# Patient Record
Sex: FEMALE | Race: BLACK OR AFRICAN AMERICAN | NOT HISPANIC OR LATINO | URBAN - METROPOLITAN AREA
[De-identification: names, ages, dates, MRNs, and addresses within clinical notes are randomized per-mention and may not be internally consistent; named-entity substitution may affect disease eponyms.]

---

## 2020-06-28 ENCOUNTER — INPATIENT (INPATIENT)
Facility: HOSPITAL | Age: 50
LOS: 4 days | Discharge: HOME | End: 2020-07-03
Attending: INTERNAL MEDICINE | Admitting: INTERNAL MEDICINE
Payer: MEDICAID

## 2020-06-28 VITALS
OXYGEN SATURATION: 100 % | HEART RATE: 71 BPM | DIASTOLIC BLOOD PRESSURE: 131 MMHG | SYSTOLIC BLOOD PRESSURE: 206 MMHG | TEMPERATURE: 99 F | RESPIRATION RATE: 16 BRPM

## 2020-06-28 LAB
ALBUMIN SERPL ELPH-MCNC: 3.9 G/DL — SIGNIFICANT CHANGE UP (ref 3.3–5)
ALP SERPL-CCNC: 73 U/L — SIGNIFICANT CHANGE UP (ref 40–120)
ALT FLD-CCNC: 6 U/L — SIGNIFICANT CHANGE UP (ref 4–33)
ANION GAP SERPL CALC-SCNC: 15 MMO/L — HIGH (ref 7–14)
APPEARANCE UR: CLEAR — SIGNIFICANT CHANGE UP
APTT BLD: 30.8 SEC — SIGNIFICANT CHANGE UP (ref 27.5–36.3)
AST SERPL-CCNC: 13 U/L — SIGNIFICANT CHANGE UP (ref 4–32)
BACTERIA # UR AUTO: NEGATIVE — SIGNIFICANT CHANGE UP
BASOPHILS # BLD AUTO: 0.03 K/UL — SIGNIFICANT CHANGE UP (ref 0–0.2)
BASOPHILS NFR BLD AUTO: 0.5 % — SIGNIFICANT CHANGE UP (ref 0–2)
BILIRUB SERPL-MCNC: < 0.2 MG/DL — LOW (ref 0.2–1.2)
BILIRUB UR-MCNC: NEGATIVE — SIGNIFICANT CHANGE UP
BLD GP AB SCN SERPL QL: NEGATIVE — SIGNIFICANT CHANGE UP
BLOOD UR QL VISUAL: HIGH
BUN SERPL-MCNC: 30 MG/DL — HIGH (ref 7–23)
CALCIUM SERPL-MCNC: 9.9 MG/DL — SIGNIFICANT CHANGE UP (ref 8.4–10.5)
CHLORIDE SERPL-SCNC: 109 MMOL/L — HIGH (ref 98–107)
CO2 SERPL-SCNC: 18 MMOL/L — LOW (ref 22–31)
COLOR SPEC: COLORLESS — SIGNIFICANT CHANGE UP
CREAT SERPL-MCNC: 2.07 MG/DL — HIGH (ref 0.5–1.3)
EOSINOPHIL # BLD AUTO: 0.16 K/UL — SIGNIFICANT CHANGE UP (ref 0–0.5)
EOSINOPHIL NFR BLD AUTO: 2.5 % — SIGNIFICANT CHANGE UP (ref 0–6)
GLUCOSE SERPL-MCNC: 97 MG/DL — SIGNIFICANT CHANGE UP (ref 70–99)
GLUCOSE UR-MCNC: NEGATIVE — SIGNIFICANT CHANGE UP
HCT VFR BLD CALC: 35.6 % — SIGNIFICANT CHANGE UP (ref 34.5–45)
HGB BLD-MCNC: 11.3 G/DL — LOW (ref 11.5–15.5)
HYALINE CASTS # UR AUTO: NEGATIVE — SIGNIFICANT CHANGE UP
IMM GRANULOCYTES NFR BLD AUTO: 0.2 % — SIGNIFICANT CHANGE UP (ref 0–1.5)
INR BLD: 1.03 — SIGNIFICANT CHANGE UP (ref 0.88–1.17)
KETONES UR-MCNC: NEGATIVE — SIGNIFICANT CHANGE UP
LEUKOCYTE ESTERASE UR-ACNC: NEGATIVE — SIGNIFICANT CHANGE UP
LYMPHOCYTES # BLD AUTO: 2.33 K/UL — SIGNIFICANT CHANGE UP (ref 1–3.3)
LYMPHOCYTES # BLD AUTO: 35.8 % — SIGNIFICANT CHANGE UP (ref 13–44)
MCHC RBC-ENTMCNC: 27.8 PG — SIGNIFICANT CHANGE UP (ref 27–34)
MCHC RBC-ENTMCNC: 31.7 % — LOW (ref 32–36)
MCV RBC AUTO: 87.7 FL — SIGNIFICANT CHANGE UP (ref 80–100)
MONOCYTES # BLD AUTO: 0.51 K/UL — SIGNIFICANT CHANGE UP (ref 0–0.9)
MONOCYTES NFR BLD AUTO: 7.8 % — SIGNIFICANT CHANGE UP (ref 2–14)
NEUTROPHILS # BLD AUTO: 3.46 K/UL — SIGNIFICANT CHANGE UP (ref 1.8–7.4)
NEUTROPHILS NFR BLD AUTO: 53.2 % — SIGNIFICANT CHANGE UP (ref 43–77)
NITRITE UR-MCNC: NEGATIVE — SIGNIFICANT CHANGE UP
NRBC # FLD: 0 K/UL — SIGNIFICANT CHANGE UP (ref 0–0)
PH UR: 6 — SIGNIFICANT CHANGE UP (ref 5–8)
PLATELET # BLD AUTO: 284 K/UL — SIGNIFICANT CHANGE UP (ref 150–400)
PMV BLD: 9.7 FL — SIGNIFICANT CHANGE UP (ref 7–13)
POTASSIUM SERPL-MCNC: 4 MMOL/L — SIGNIFICANT CHANGE UP (ref 3.5–5.3)
POTASSIUM SERPL-SCNC: 4 MMOL/L — SIGNIFICANT CHANGE UP (ref 3.5–5.3)
PROT SERPL-MCNC: 7.3 G/DL — SIGNIFICANT CHANGE UP (ref 6–8.3)
PROT UR-MCNC: 200 — HIGH
PROTHROM AB SERPL-ACNC: 11.8 SEC — SIGNIFICANT CHANGE UP (ref 9.8–13.1)
RBC # BLD: 4.06 M/UL — SIGNIFICANT CHANGE UP (ref 3.8–5.2)
RBC # FLD: 21.3 % — HIGH (ref 10.3–14.5)
RBC CASTS # UR COMP ASSIST: >50 — HIGH (ref 0–?)
RH IG SCN BLD-IMP: POSITIVE — SIGNIFICANT CHANGE UP
SODIUM SERPL-SCNC: 142 MMOL/L — SIGNIFICANT CHANGE UP (ref 135–145)
SP GR SPEC: 1.02 — SIGNIFICANT CHANGE UP (ref 1–1.04)
SQUAMOUS # UR AUTO: SIGNIFICANT CHANGE UP
TROPONIN T, HIGH SENSITIVITY: 20 NG/L — SIGNIFICANT CHANGE UP (ref ?–14)
UROBILINOGEN FLD QL: NORMAL — SIGNIFICANT CHANGE UP
WBC # BLD: 6.5 K/UL — SIGNIFICANT CHANGE UP (ref 3.8–10.5)
WBC # FLD AUTO: 6.5 K/UL — SIGNIFICANT CHANGE UP (ref 3.8–10.5)
WBC UR QL: SIGNIFICANT CHANGE UP (ref 0–?)

## 2020-06-28 PROCEDURE — 70450 CT HEAD/BRAIN W/O DYE: CPT | Mod: 26

## 2020-06-28 PROCEDURE — 76830 TRANSVAGINAL US NON-OB: CPT | Mod: 26

## 2020-06-28 RX ORDER — SODIUM CHLORIDE 9 MG/ML
1000 INJECTION INTRAMUSCULAR; INTRAVENOUS; SUBCUTANEOUS ONCE
Refills: 0 | Status: COMPLETED | OUTPATIENT
Start: 2020-06-28 | End: 2020-06-28

## 2020-06-28 RX ORDER — LABETALOL HCL 100 MG
10 TABLET ORAL ONCE
Refills: 0 | Status: COMPLETED | OUTPATIENT
Start: 2020-06-28 | End: 2020-06-28

## 2020-06-28 RX ORDER — ACETAMINOPHEN 500 MG
650 TABLET ORAL ONCE
Refills: 0 | Status: COMPLETED | OUTPATIENT
Start: 2020-06-28 | End: 2020-06-28

## 2020-06-28 RX ORDER — NICARDIPINE HYDROCHLORIDE 30 MG/1
5 CAPSULE, EXTENDED RELEASE ORAL
Qty: 40 | Refills: 0 | Status: DISCONTINUED | OUTPATIENT
Start: 2020-06-28 | End: 2020-06-29

## 2020-06-28 RX ORDER — METOCLOPRAMIDE HCL 10 MG
10 TABLET ORAL ONCE
Refills: 0 | Status: COMPLETED | OUTPATIENT
Start: 2020-06-28 | End: 2020-06-28

## 2020-06-28 RX ORDER — MORPHINE SULFATE 50 MG/1
2 CAPSULE, EXTENDED RELEASE ORAL ONCE
Refills: 0 | Status: DISCONTINUED | OUTPATIENT
Start: 2020-06-28 | End: 2020-06-28

## 2020-06-28 RX ADMIN — SODIUM CHLORIDE 1000 MILLILITER(S): 9 INJECTION INTRAMUSCULAR; INTRAVENOUS; SUBCUTANEOUS at 22:15

## 2020-06-28 RX ADMIN — MORPHINE SULFATE 2 MILLIGRAM(S): 50 CAPSULE, EXTENDED RELEASE ORAL at 22:15

## 2020-06-28 RX ADMIN — Medication 10 MILLIGRAM(S): at 22:59

## 2020-06-28 RX ADMIN — Medication 10 MILLIGRAM(S): at 21:15

## 2020-06-28 RX ADMIN — Medication 650 MILLIGRAM(S): at 22:15

## 2020-06-28 RX ADMIN — NICARDIPINE HYDROCHLORIDE 25 MG/HR: 30 CAPSULE, EXTENDED RELEASE ORAL at 23:25

## 2020-06-28 RX ADMIN — Medication 10 MILLIGRAM(S): at 22:20

## 2020-06-28 NOTE — ED ADULT NURSE NOTE - OBJECTIVE STATEMENT
pt is awake, alert and oriented, breathing even and unlabored, reports dysfunctional uterine bleeding X1 month, states bleeding had slowed and stopped around fathers day, states starting this morning pt had acute resurgence of bleeding, approx 1 pad q30min, with associated dizziness. pt is ambulatory with steady gait at this time, complaining of pain in LRQ abdominal area and sharp/pressure pain in vagina. Denies fevers/n.v.d.  bedside pelvic and ultrasound performed by resident with Maddie CHEN present, 18G placed RAC, blood sent to lab, will continue to monitor.

## 2020-06-28 NOTE — CONSULT NOTE ADULT - SUBJECTIVE AND OBJECTIVE BOX
THANIA STUBBS  49y  Female [id number]    HPI: 50 yo  LMP unclear due to prolonged bleeding presenting to the ED with 1 month of intermittent heavy vaginal bleeding, w/ increased bleeding today using 12 pads over 6 hours with symptomatic anemia experiencing dizziness and lightheadedness.  Pt reports being told when she was pregnancy many years ago she had small fibroids, however, has not previously been symptomatic.  Periods are typically monthly w/ moderate flow lasting 4 days using 1 pad/4 hrs.  Pt began bleeding at the beginning of July what she thought was her normal period and has continued to bleed throughout the month.  Today bleeding increased and pt experienced large passage of clots, the greatest about a fist size.         Name of GYN Physician: [facility]    POB:     twin pregnancy c section at 7 months   FT r c section  TOP x2 by D&C ,     Home meds:   clonadine 0.5 BID  norvastatin 100 mg QD    Hospital Meds:   MEDICATIONS  (STANDING):  metoclopramide Injectable 10 milliGRAM(s) IV Push once    Allergies    No Known Allergies      PAST MEDICAL & SURGICAL HISTORY:  Hypertension  c section x2  D&C x2  R ankle broken    Social History:  Denies smoking use, drug use, alcohol use    Vital Signs Last 24 Hrs  T(C): 37 (2020 21:10), Max: 37 (2020 20:09)  T(F): 98.6 (2020 21:10), Max: 98.6 (2020 20:09)  HR: 75 (2020 22:43) (69 - 81)  BP: 270/123 (2020 22:43) (206/131 - 270/123)  BP(mean): 167 (2020 22:43) (167 - 167)  RR: 18 (2020 22:43) (16 - 18)  SpO2: 100% (2020 22:43) (100% - 100%)    Physical Exam:   General: sitting comftorably in bed, NAD   HEENT: neck supple, full ROM  CV: RR S1S2 no m/r/g  Lungs: CTA b/l, good air flow b/l   Back: No CVA tenderness  Abd: Soft, non-tender, non-distended.  Bowel sounds present.    :  No bleeding on pad.    External labia wnl.  Bimanual exam with no cervical motion tenderness, uterus wnl, adnexa non palpable b/l.  Cervix closed vs. Cervix dilated    cm   Speculum Exam: No active bleeding from os.  Physiologic discharge.    Ext: non-tender b/l, no edema     LABS:                          11.3   6.50  )-----------( 284      ( 2020 20:42 )             35.6         142  |  109<H>  |  30<H>  ----------------------------<  97  4.0   |  18<L>  |  2.07<H>    Ca    9.9      2020 20:42    TPro  7.3  /  Alb  3.9  /  TBili  < 0.2<L>  /  DBili  x   /  AST  13  /  ALT  6   /  AlkPhos  73      I&O's Detail    PT/INR - ( 2020 20:42 )   PT: 11.8 SEC;   INR: 1.03          PTT - ( 2020 20:42 )  PTT:30.8 SEC  Urinalysis Basic - ( 2020 21:00 )    Color: COLORLESS / Appearance: CLEAR / S.016 / pH: 6.0  Gluc: NEGATIVE / Ketone: NEGATIVE  / Bili: NEGATIVE / Urobili: NORMAL   Blood: MODERATE / Protein: 200 / Nitrite: NEGATIVE   Leuk Esterase: NEGATIVE / RBC: >50 / WBC 3-5   Sq Epi: OCC / Non Sq Epi: x / Bacteria: NEGATIVE    RADIOLOGY & ADDITIONAL STUDIES: THANIA STUBBS  49y  Female [id number]    HPI: 50 yo  LMP unclear due to prolonged bleeding presenting to the ED with 1 month of intermittent heavy vaginal bleeding, w/ increased bleeding today using 12 pads over 6 hours with symptomatic anemia experiencing dizziness and lightheadedness.  Pt reports being told when she was pregnancy many years ago she had small fibroids, however, has not previously been symptomatic.  Periods are typically monthly w/ moderate flow lasting 4 days using 1 pad/4 hrs.  Pt began bleeding at the beginning of July what she thought was her normal period and has continued to bleed throughout the month.  Today bleeding increased and pt experienced large passage of clots, the greatest about a fist size.         Name of GYN Physician: [facility]    POB:     twin pregnancy c section at 7 months   FT r c section  TOP x2 by D&C ,     Home meds:   clonadine 0.5 BID  norvastatin 100 mg QD    Hospital Meds:   MEDICATIONS  (STANDING):  metoclopramide Injectable 10 milliGRAM(s) IV Push once    Allergies    No Known Allergies      PAST MEDICAL & SURGICAL HISTORY:  Hypertension  c section x2  D&C x2  R ankle broken    Social History:  Denies smoking use, drug use, alcohol use    Vital Signs Last 24 Hrs  T(C): 37 (2020 21:10), Max: 37 (2020 20:09)  T(F): 98.6 (2020 21:10), Max: 98.6 (2020 20:09)  HR: 75 (2020 22:43) (69 - 81)  BP: 270/123 (2020 22:43) (206/131 - 270/123)  BP(mean): 167 (2020 22:43) (167 - 167)  RR: 18 (2020 22:43) (16 - 18)  SpO2: 100% (2020 22:43) (100% - 100%)    Physical Exam:   General: sitting comfortably in bed  CV: RR S1S2 no m/r/g  Lungs: CTA b/l, good air flow b/l   Abd: Soft, non-tender, non-distended.  Bowel sounds present.    :  Saturated pad.    External labia wnl.  Bimanual exam with no cervical motion tenderness, uterus measuring 12 wks size, adnexa non palpable b/l.  Cervix closed  Speculum Exam: Mild bleeding from os.   Ext: non-tender b/l, no edema     LABS:                          11.3   6.50  )-----------( 284      ( 2020 20:42 )             35.6         142  |  109<H>  |  30<H>  ----------------------------<  97  4.0   |  18<L>  |  2.07<H>    Ca    9.9      2020 20:42    TPro  7.3  /  Alb  3.9  /  TBili  < 0.2<L>  /  DBili  x   /  AST  13  /  ALT  6   /  AlkPhos  73      I&O's Detail    PT/INR - ( 2020 20:42 )   PT: 11.8 SEC;   INR: 1.03          PTT - ( 2020 20:42 )  PTT:30.8 SEC  Urinalysis Basic - ( 2020 21:00 )    Color: COLORLESS / Appearance: CLEAR / S.016 / pH: 6.0  Gluc: NEGATIVE / Ketone: NEGATIVE  / Bili: NEGATIVE / Urobili: NORMAL   Blood: MODERATE / Protein: 200 / Nitrite: NEGATIVE   Leuk Esterase: NEGATIVE / RBC: >50 / WBC 3-5   Sq Epi: OCC / Non Sq Epi: x / Bacteria: NEGATIVE    RADIOLOGY & ADDITIONAL STUDIES: THANIA STUBBS  49y  Female [id number]    HPI: 48 yo  LMP unclear due to prolonged bleeding presenting to the ED with 1 month of intermittent heavy vaginal bleeding, w/ increased bleeding today using 12 pads over 6 hours with symptomatic anemia experiencing dizziness and lightheadedness.  Pt reports being told when she was pregnant many years ago she had small fibroids, however, has not previously been symptomatic.  Periods are typically monthly w/ moderate flow lasting 4 days using 1 pad/4 hrs.  Pt began bleeding at the beginning of  what she thought was her normal period and has continued to bleed throughout the month.  Today bleeding increased and pt experienced large passage of clots, the greatest about a fist size. Pt is reporting complaints of headache w/ frontal throbbing pain w/ associated blurry vision, dizziness, and lightheadedness.       Name of GYN Physician: [facility]    POB:     twin pregnancy c section at 7 months  2001 FT r c section  TOP x2 by D&C ,     Home meds:   clonadine 0.5 BID  norvastatin 100 mg QD    Hospital Meds:   MEDICATIONS  (STANDING):  metoclopramide Injectable 10 milliGRAM(s) IV Push once    Allergies    No Known Allergies      PAST MEDICAL & SURGICAL HISTORY:  Hypertension  c section x2  D&C x2  R ankle broken    Social History:  Denies smoking use, drug use, alcohol use    Vital Signs Last 24 Hrs  T(C): 37 (2020 21:10), Max: 37 (2020 20:09)  T(F): 98.6 (2020 21:10), Max: 98.6 (2020 20:09)  HR: 75 (2020 22:43) (69 - 81)  BP: 270/123 (2020 22:43) (206/131 - 270/123)  BP(mean): 167 (2020 22:43) (167 - 167)  RR: 18 (2020 22:43) (16 - 18)  SpO2: 100% (2020 22:43) (100% - 100%)    Physical Exam:   General: sitting comfortably in bed  CV: RR S1S2 no m/r/g  Lungs: CTA b/l, good air flow b/l   Abd: Soft, non-tender, non-distended.  Bowel sounds present.    :  Saturated pad.    External labia wnl.  Bimanual exam with no cervical motion tenderness, uterus measuring 12 wks size, adnexa non palpable b/l.  Cervix closed  Speculum Exam: Mild bleeding from os.   Ext: non-tender b/l, no edema     LABS:                          11.3   6.50  )-----------( 284      ( 2020 20:42 )             35.6         142  |  109<H>  |  30<H>  ----------------------------<  97  4.0   |  18<L>  |  2.07<H>    Ca    9.9      2020 20:42    TPro  7.3  /  Alb  3.9  /  TBili  < 0.2<L>  /  DBili  x   /  AST  13  /  ALT  6   /  AlkPhos  73      I&O's Detail    PT/INR - ( 2020 20:42 )   PT: 11.8 SEC;   INR: 1.03          PTT - ( 2020 20:42 )  PTT:30.8 SEC  Urinalysis Basic - ( 2020 21:00 )    Color: COLORLESS / Appearance: CLEAR / S.016 / pH: 6.0  Gluc: NEGATIVE / Ketone: NEGATIVE  / Bili: NEGATIVE / Urobili: NORMAL   Blood: MODERATE / Protein: 200 / Nitrite: NEGATIVE   Leuk Esterase: NEGATIVE / RBC: >50 / WBC 3-5   Sq Epi: OCC / Non Sq Epi: x / Bacteria: NEGATIVE    RADIOLOGY & ADDITIONAL STUDIES:  < from: US Transvaginal (20 @ 21:37) >  EXAM:  US TRANSVAGINAL        PROCEDURE DATE:  2020       INTERPRETATION:  CLINICAL INFORMATION: Vaginal bleeding.    LMP: 2020.    COMPARISON: None available.    TECHNIQUE:   Endovaginal pelvic sonogram only. Color and Spectral Doppler was performed.     FINDINGS:    Uterus: Measures 10.9 x 5.9 x 8.6 cm. and demonstrates a heterogeneous echotexture 3.6 x 2.8 x 2.7 cm intramural uterine fibroid suggested. In addition, there is increased echotexture of the central uterine region which is nonspecific, however may be seen with adenomyosis.  Endometrium: 7 mm. Within normal limits.    Right ovary: 2.7 x 2.7 x 2.1 cm. Within normal limits. Normal arterial waveforms. Complex septated cyst measuring 1.7 x 1.8 x 1.5 cm.  Left ovary: Not visualized on this exam.    Fluid: None.    IMPRESSION:  1. There is a 3.6 x 2.8 x 2.7 cm intramural uterine fibroid.  2. There is also a 1.7 x 1.8 x 1.5 cm complex septated cyst of the right ovary. Follow-up ultrasound can be obtained in 2 cycles to document resolution/stability.  3. Correlate with possible adenomyosis.  4. Left ovary was not visualized on this exam.    KEERTHI ROSE M.D., RADIOLOGY RESIDENT  This document has been electronically signed.  SHAYNA HULL M.D., ATTENDING RADIOLOGIST  This document has been electronically signed. 2020 11:22PM THANIA STUBBS  49y  Female [id number]    HPI: 50 yo  LMP unclear due to prolonged bleeding presenting to the ED with 1 month of intermittent heavy vaginal bleeding, w/ increased bleeding today using 12 pads over 6 hours with symptomatic anemia experiencing dizziness and lightheadedness.  Pt reports being told when she was pregnant many years ago she had small fibroids, however, has not previously been symptomatic.  Periods are typically monthly w/ moderate flow lasting 4 days using 1 pad/4 hrs.  Pt began bleeding at the beginning of  what she thought was her normal period and has continued to bleed throughout the month.  Today bleeding increased and pt experienced large passage of clots, the greatest about a fist size. Pt is reporting complaints of headache w/ frontal throbbing pain w/ associated blurry vision, dizziness, and lightheadedness.       Name of GYN Physician: [facility]    POB:     twin pregnancy c section at 7 months  2001 FT r c section  TOP x2 by D&C ,     Home meds:   clonadine 0.5 BID  norvastatin 100 mg QD    Hospital Meds:   MEDICATIONS  (STANDING):  metoclopramide Injectable 10 milliGRAM(s) IV Push once    Allergies    No Known Allergies      PAST MEDICAL & SURGICAL HISTORY:  Hypertension  c section x2  D&C x2  R ankle broken    Social History:  Denies smoking use, drug use, alcohol use    Vital Signs Last 24 Hrs  T(C): 37 (2020 21:10), Max: 37 (2020 20:09)  T(F): 98.6 (2020 21:10), Max: 98.6 (2020 20:09)  HR: 75 (2020 22:43) (69 - 81)  BP: 270/123 (2020 22:43) (206/131 - 270/123)  BP(mean): 167 (2020 22:43) (167 - 167)  RR: 18 (2020 22:43) (16 - 18)  SpO2: 100% (2020 22:43) (100% - 100%)    Physical Exam:   General: sitting comfortably in bed  CV: RR S1S2 no m/r/g  Lungs: CTA b/l, good air flow b/l   Abd: Soft, non-tender, non-distended.  Bowel sounds present.    :  Saturated pad.    External labia wnl.  Bimanual exam with no cervical motion tenderness, uterus measuring 12 wks size, adnexa non palpable b/l.  Cervix closed  Speculum Exam: Mild bleeding from os.   Ext: non-tender b/l, no edema     LABS:             10.7   7.08  )-----------( 269      (  @ 23:35 )             34.1                11.3   6.50  )-----------( 284      (  @ 20:42 )             35.6           142  |  109<H>  |  30<H>  ----------------------------<  97  4.0   |  18<L>  |  2.07<H>    Ca    9.9      2020 20:42    TPro  7.3  /  Alb  3.9  /  TBili  < 0.2<L>  /  DBili  x   /  AST  13  /  ALT  6   /  AlkPhos  73      I&O's Detail    PT/INR - ( 2020 20:42 )   PT: 11.8 SEC;   INR: 1.03          PTT - ( 2020 20:42 )  PTT:30.8 SEC  Urinalysis Basic - ( 2020 21:00 )    Color: COLORLESS / Appearance: CLEAR / S.016 / pH: 6.0  Gluc: NEGATIVE / Ketone: NEGATIVE  / Bili: NEGATIVE / Urobili: NORMAL   Blood: MODERATE / Protein: 200 / Nitrite: NEGATIVE   Leuk Esterase: NEGATIVE / RBC: >50 / WBC 3-5   Sq Epi: OCC / Non Sq Epi: x / Bacteria: NEGATIVE    RADIOLOGY & ADDITIONAL STUDIES:  < from: US Transvaginal (20 @ 21:37) >  EXAM:  US TRANSVAGINAL        PROCEDURE DATE:  2020       INTERPRETATION:  CLINICAL INFORMATION: Vaginal bleeding.    LMP: 2020.    COMPARISON: None available.    TECHNIQUE:   Endovaginal pelvic sonogram only. Color and Spectral Doppler was performed.     FINDINGS:    Uterus: Measures 10.9 x 5.9 x 8.6 cm. and demonstrates a heterogeneous echotexture 3.6 x 2.8 x 2.7 cm intramural uterine fibroid suggested. In addition, there is increased echotexture of the central uterine region which is nonspecific, however may be seen with adenomyosis.  Endometrium: 7 mm. Within normal limits.    Right ovary: 2.7 x 2.7 x 2.1 cm. Within normal limits. Normal arterial waveforms. Complex septated cyst measuring 1.7 x 1.8 x 1.5 cm.  Left ovary: Not visualized on this exam.    Fluid: None.    IMPRESSION:  1. There is a 3.6 x 2.8 x 2.7 cm intramural uterine fibroid.  2. There is also a 1.7 x 1.8 x 1.5 cm complex septated cyst of the right ovary. Follow-up ultrasound can be obtained in 2 cycles to document resolution/stability.  3. Correlate with possible adenomyosis.  4. Left ovary was not visualized on this exam.    KEERTHI ROSE M.D., RADIOLOGY RESIDENT  This document has been electronically signed.  SHAYNA HULL M.D., ATTENDING RADIOLOGIST  This document has been electronically signed. 2020 11:22PM

## 2020-06-28 NOTE — ED PROVIDER NOTE - CLINICAL SUMMARY MEDICAL DECISION MAKING FREE TEXT BOX
h.o fibroids, p.w acute vaginal bleeding 1pad /30 minutes today for 6 hours. no traumas, mild abdominal pain lower. will get t&s, tvus, labs, coag, pelvic, if severe anemia, transfusion. due to hx of fibroids, will not persuade ct ap for other bleeding etiology. ua and urine culture. pain control. dispo pending labs and advance imaging.

## 2020-06-28 NOTE — ED PROVIDER NOTE - OBJECTIVE STATEMENT
49F, h.o fibroid, HTN, p.w heavy vaginal bleeding today w. clots, 1 pad every 30 minutes for 6 hrs. Patient has lower abdominal pain today, sharp nonradiating. LMS last wk and no bleeding for the last 3-4 days. patient has blurry vision and dizziness. no fever, cough, shortness of breath, trauma.

## 2020-06-28 NOTE — ED PROVIDER NOTE - ATTENDING CONTRIBUTION TO CARE
Aubrie Cedeno M.D: 49F hx fibroids p/w heavy VB x1 day, going through one pad every 30 minutes. +dizziness + lower abd cramping no cp no sob no n/v/c/d. never had anything like this before. on exam pt seems anxious, lungs ctab, heart rrr, ttp suprapubically, pelvic exam performed by resident, oozing from closed os. suspect that pt is having dysfunctional uterine bleeding from fibroids and that blood loss is causing severe dizziness, but pt also severely hypertensive and ekg with twi in lateral leads.will attempt to control bp, check labs including troponin to assess for anemia, end organ damage from htn, tvus, ob consult and reassess.

## 2020-06-28 NOTE — ED PROVIDER NOTE - PHYSICAL EXAMINATION
General: NAD, good hygiene, well developed  HENT: Atraumatic, EOMI, no conjunctivae injection, moist mucosa  Neck: normal ROM and trachea midline   Cardiovascular: RRR, S1&2, no M or R, radial pulses equal and b/l  Respiratory: CTABL, no wheezes or crackles, no decreased breath sounds  Abdominal: soft and tender in the lower abdomen, non distended, neg for guarding, novak's sign, rovsing's sign, mcburney's sign, no CVA tenderness   Extremities: no edema of the legs/feet, DP/PT equal b/l  Skin: warm, well perfused  Neurologic: nonfocal, AAOx3  Psych: normal mood and affect General: NAD, good hygiene, well developed  HENT: Atraumatic, EOMI, no conjunctivae injection, moist mucosa  Neck: normal ROM and trachea midline   Cardiovascular: RRR, S1&2, no M or R, radial pulses equal and b/l  Respiratory: CTABL, no wheezes or crackles, no decreased breath sounds  Abdominal: soft and mild tenderness in the lower abdomen, non distended, neg for guarding, novak's sign, rovsing's sign, mcburney's sign, no CVA tenderness   Pelvic: os closed w. mild blood oozing, no sign of ulcers.   Extremities: no edema of the legs/feet, DP/PT equal b/l  Skin: warm, well perfused  Neurologic: nonfocal, AAOx3  Psych: normal mood and affect

## 2020-06-28 NOTE — ED ADULT TRIAGE NOTE - CHIEF COMPLAINT QUOTE
Pt. c/o vaginal bleeding and pelvic pain x 1 month starting at start of period. Also c/o dizziness and blurry vision. States she's been soaking a pad every half an hour. BP noted to be elevated in triage. h/o HTN and has been compliant with meds. Denies cp, sob, n/v.

## 2020-06-28 NOTE — CONSULT NOTE ADULT - ASSESSMENT
48 yo  w/ heavy vaginal bleeding secondary to fibroids presenting w/ increase in bleeding and symptomatic anemia. 48 yo  w/ heavy vaginal bleeding secondary to fibroids presenting w/ increase in bleeding and symptomatic anemia.  TVUS w/ evidence of intramural fibroid and adenomyosis.  At time of evaluation, bleeding controlled and decreased in flow.  Pt currently with uncontrolled blood pressure in hypertensive urgency requiring IV drip antihypertensive.

## 2020-06-28 NOTE — ED PROVIDER NOTE - PROGRESS NOTE DETAILS
Jose Parson, PGY 2: patient is hypertensive to 200s and take clonidine at home and 10 labetalol is given. will monitor. Jose Parson, PGY 2: patient continue to have SBP of 270, yesterday BP was 130s per patient. Patient has worsening headache w.o any neuro deficits, concerning for ICH, will get CT head and will start on nifedipine ggt for bp control. spoke with ob/gyn and pending rpt cbc. Jose Parson, PGY 2: sleeping comfortably, nifedipine is off for 30 minutes. bp 100s.

## 2020-06-29 DIAGNOSIS — Z98.891 HISTORY OF UTERINE SCAR FROM PREVIOUS SURGERY: Chronic | ICD-10-CM

## 2020-06-29 DIAGNOSIS — Z79.899 OTHER LONG TERM (CURRENT) DRUG THERAPY: ICD-10-CM

## 2020-06-29 DIAGNOSIS — Z02.9 ENCOUNTER FOR ADMINISTRATIVE EXAMINATIONS, UNSPECIFIED: ICD-10-CM

## 2020-06-29 DIAGNOSIS — I16.1 HYPERTENSIVE EMERGENCY: ICD-10-CM

## 2020-06-29 DIAGNOSIS — I16.0 HYPERTENSIVE URGENCY: ICD-10-CM

## 2020-06-29 DIAGNOSIS — Z29.9 ENCOUNTER FOR PROPHYLACTIC MEASURES, UNSPECIFIED: ICD-10-CM

## 2020-06-29 DIAGNOSIS — N93.9 ABNORMAL UTERINE AND VAGINAL BLEEDING, UNSPECIFIED: ICD-10-CM

## 2020-06-29 DIAGNOSIS — N17.9 ACUTE KIDNEY FAILURE, UNSPECIFIED: ICD-10-CM

## 2020-06-29 DIAGNOSIS — R79.89 OTHER SPECIFIED ABNORMAL FINDINGS OF BLOOD CHEMISTRY: ICD-10-CM

## 2020-06-29 LAB
ALBUMIN SERPL ELPH-MCNC: 3.6 G/DL — SIGNIFICANT CHANGE UP (ref 3.3–5)
ALP SERPL-CCNC: 57 U/L — SIGNIFICANT CHANGE UP (ref 40–120)
ALT FLD-CCNC: 8 U/L — SIGNIFICANT CHANGE UP (ref 4–33)
ANION GAP SERPL CALC-SCNC: 11 MMO/L — SIGNIFICANT CHANGE UP (ref 7–14)
ANION GAP SERPL CALC-SCNC: 14 MMO/L — SIGNIFICANT CHANGE UP (ref 7–14)
AST SERPL-CCNC: 11 U/L — SIGNIFICANT CHANGE UP (ref 4–32)
BASOPHILS # BLD AUTO: 0.02 K/UL — SIGNIFICANT CHANGE UP (ref 0–0.2)
BASOPHILS NFR BLD AUTO: 0.3 % — SIGNIFICANT CHANGE UP (ref 0–2)
BILIRUB SERPL-MCNC: < 0.2 MG/DL — LOW (ref 0.2–1.2)
BUN SERPL-MCNC: 27 MG/DL — HIGH (ref 7–23)
BUN SERPL-MCNC: 28 MG/DL — HIGH (ref 7–23)
CALCIUM SERPL-MCNC: 9 MG/DL — SIGNIFICANT CHANGE UP (ref 8.4–10.5)
CALCIUM SERPL-MCNC: 9.1 MG/DL — SIGNIFICANT CHANGE UP (ref 8.4–10.5)
CHLORIDE SERPL-SCNC: 110 MMOL/L — HIGH (ref 98–107)
CHLORIDE SERPL-SCNC: 111 MMOL/L — HIGH (ref 98–107)
CHOLEST SERPL-MCNC: 172 MG/DL — SIGNIFICANT CHANGE UP (ref 120–199)
CO2 SERPL-SCNC: 15 MMOL/L — LOW (ref 22–31)
CO2 SERPL-SCNC: 19 MMOL/L — LOW (ref 22–31)
CREAT SERPL-MCNC: 1.75 MG/DL — HIGH (ref 0.5–1.3)
CREAT SERPL-MCNC: 1.89 MG/DL — HIGH (ref 0.5–1.3)
CULTURE RESULTS: SIGNIFICANT CHANGE UP
EOSINOPHIL # BLD AUTO: 0.11 K/UL — SIGNIFICANT CHANGE UP (ref 0–0.5)
EOSINOPHIL NFR BLD AUTO: 1.6 % — SIGNIFICANT CHANGE UP (ref 0–6)
GLUCOSE SERPL-MCNC: 119 MG/DL — HIGH (ref 70–99)
GLUCOSE SERPL-MCNC: 92 MG/DL — SIGNIFICANT CHANGE UP (ref 70–99)
HBA1C BLD-MCNC: 4.6 % — SIGNIFICANT CHANGE UP (ref 4–5.6)
HCT VFR BLD CALC: 29.5 % — LOW (ref 34.5–45)
HCT VFR BLD CALC: 32.1 % — LOW (ref 34.5–45)
HCT VFR BLD CALC: 34.1 % — LOW (ref 34.5–45)
HDLC SERPL-MCNC: 68 MG/DL — HIGH (ref 45–65)
HGB BLD-MCNC: 10 G/DL — LOW (ref 11.5–15.5)
HGB BLD-MCNC: 10.7 G/DL — LOW (ref 11.5–15.5)
HGB BLD-MCNC: 9.4 G/DL — LOW (ref 11.5–15.5)
IMM GRANULOCYTES NFR BLD AUTO: 0.3 % — SIGNIFICANT CHANGE UP (ref 0–1.5)
LIPID PNL WITH DIRECT LDL SERPL: 98 MG/DL — SIGNIFICANT CHANGE UP
LYMPHOCYTES # BLD AUTO: 1.42 K/UL — SIGNIFICANT CHANGE UP (ref 1–3.3)
LYMPHOCYTES # BLD AUTO: 20.7 % — SIGNIFICANT CHANGE UP (ref 13–44)
MCHC RBC-ENTMCNC: 27.6 PG — SIGNIFICANT CHANGE UP (ref 27–34)
MCHC RBC-ENTMCNC: 27.7 PG — SIGNIFICANT CHANGE UP (ref 27–34)
MCHC RBC-ENTMCNC: 28.4 PG — SIGNIFICANT CHANGE UP (ref 27–34)
MCHC RBC-ENTMCNC: 31.2 % — LOW (ref 32–36)
MCHC RBC-ENTMCNC: 31.4 % — LOW (ref 32–36)
MCHC RBC-ENTMCNC: 31.9 % — LOW (ref 32–36)
MCV RBC AUTO: 87.9 FL — SIGNIFICANT CHANGE UP (ref 80–100)
MCV RBC AUTO: 88.9 FL — SIGNIFICANT CHANGE UP (ref 80–100)
MCV RBC AUTO: 89.1 FL — SIGNIFICANT CHANGE UP (ref 80–100)
MONOCYTES # BLD AUTO: 0.66 K/UL — SIGNIFICANT CHANGE UP (ref 0–0.9)
MONOCYTES NFR BLD AUTO: 9.6 % — SIGNIFICANT CHANGE UP (ref 2–14)
NEUTROPHILS # BLD AUTO: 4.63 K/UL — SIGNIFICANT CHANGE UP (ref 1.8–7.4)
NEUTROPHILS NFR BLD AUTO: 67.5 % — SIGNIFICANT CHANGE UP (ref 43–77)
NRBC # FLD: 0 K/UL — SIGNIFICANT CHANGE UP (ref 0–0)
PLATELET # BLD AUTO: 229 K/UL — SIGNIFICANT CHANGE UP (ref 150–400)
PLATELET # BLD AUTO: 248 K/UL — SIGNIFICANT CHANGE UP (ref 150–400)
PLATELET # BLD AUTO: 269 K/UL — SIGNIFICANT CHANGE UP (ref 150–400)
PMV BLD: 10.1 FL — SIGNIFICANT CHANGE UP (ref 7–13)
PMV BLD: 10.3 FL — SIGNIFICANT CHANGE UP (ref 7–13)
PMV BLD: 9.8 FL — SIGNIFICANT CHANGE UP (ref 7–13)
POTASSIUM SERPL-MCNC: 4.3 MMOL/L — SIGNIFICANT CHANGE UP (ref 3.5–5.3)
POTASSIUM SERPL-MCNC: 4.7 MMOL/L — SIGNIFICANT CHANGE UP (ref 3.5–5.3)
POTASSIUM SERPL-SCNC: 4.3 MMOL/L — SIGNIFICANT CHANGE UP (ref 3.5–5.3)
POTASSIUM SERPL-SCNC: 4.7 MMOL/L — SIGNIFICANT CHANGE UP (ref 3.5–5.3)
PROT SERPL-MCNC: 6.5 G/DL — SIGNIFICANT CHANGE UP (ref 6–8.3)
RBC # BLD: 3.31 M/UL — LOW (ref 3.8–5.2)
RBC # BLD: 3.61 M/UL — LOW (ref 3.8–5.2)
RBC # BLD: 3.88 M/UL — SIGNIFICANT CHANGE UP (ref 3.8–5.2)
RBC # FLD: 21.2 % — HIGH (ref 10.3–14.5)
RBC # FLD: 21.3 % — HIGH (ref 10.3–14.5)
RBC # FLD: 21.4 % — HIGH (ref 10.3–14.5)
SARS-COV-2 RNA SPEC QL NAA+PROBE: SIGNIFICANT CHANGE UP
SODIUM SERPL-SCNC: 140 MMOL/L — SIGNIFICANT CHANGE UP (ref 135–145)
SODIUM SERPL-SCNC: 140 MMOL/L — SIGNIFICANT CHANGE UP (ref 135–145)
SPECIMEN SOURCE: SIGNIFICANT CHANGE UP
TRIGL SERPL-MCNC: 89 MG/DL — SIGNIFICANT CHANGE UP (ref 10–149)
TROPONIN T, HIGH SENSITIVITY: 18 NG/L — SIGNIFICANT CHANGE UP (ref ?–14)
TSH SERPL-MCNC: 1.39 UIU/ML — SIGNIFICANT CHANGE UP (ref 0.27–4.2)
WBC # BLD: 6.86 K/UL — SIGNIFICANT CHANGE UP (ref 3.8–10.5)
WBC # BLD: 7.08 K/UL — SIGNIFICANT CHANGE UP (ref 3.8–10.5)
WBC # BLD: 7.63 K/UL — SIGNIFICANT CHANGE UP (ref 3.8–10.5)
WBC # FLD AUTO: 6.86 K/UL — SIGNIFICANT CHANGE UP (ref 3.8–10.5)
WBC # FLD AUTO: 7.08 K/UL — SIGNIFICANT CHANGE UP (ref 3.8–10.5)
WBC # FLD AUTO: 7.63 K/UL — SIGNIFICANT CHANGE UP (ref 3.8–10.5)

## 2020-06-29 PROCEDURE — 99223 1ST HOSP IP/OBS HIGH 75: CPT

## 2020-06-29 RX ORDER — LOSARTAN POTASSIUM 100 MG/1
50 TABLET, FILM COATED ORAL DAILY
Refills: 0 | Status: DISCONTINUED | OUTPATIENT
Start: 2020-06-29 | End: 2020-06-30

## 2020-06-29 RX ORDER — SODIUM CHLORIDE 9 MG/ML
500 INJECTION INTRAMUSCULAR; INTRAVENOUS; SUBCUTANEOUS ONCE
Refills: 0 | Status: DISCONTINUED | OUTPATIENT
Start: 2020-06-29 | End: 2020-06-29

## 2020-06-29 RX ORDER — SODIUM CHLORIDE 9 MG/ML
3 INJECTION INTRAMUSCULAR; INTRAVENOUS; SUBCUTANEOUS EVERY 8 HOURS
Refills: 0 | Status: DISCONTINUED | OUTPATIENT
Start: 2020-06-29 | End: 2020-07-03

## 2020-06-29 RX ADMIN — LOSARTAN POTASSIUM 50 MILLIGRAM(S): 100 TABLET, FILM COATED ORAL at 21:39

## 2020-06-29 RX ADMIN — Medication 0.5 MILLIGRAM(S): at 01:53

## 2020-06-29 RX ADMIN — SODIUM CHLORIDE 3 MILLILITER(S): 9 INJECTION INTRAMUSCULAR; INTRAVENOUS; SUBCUTANEOUS at 21:36

## 2020-06-29 RX ADMIN — SODIUM CHLORIDE 3 MILLILITER(S): 9 INJECTION INTRAMUSCULAR; INTRAVENOUS; SUBCUTANEOUS at 16:47

## 2020-06-29 RX ADMIN — SODIUM CHLORIDE 3 MILLILITER(S): 9 INJECTION INTRAMUSCULAR; INTRAVENOUS; SUBCUTANEOUS at 05:50

## 2020-06-29 RX ADMIN — Medication 0.1 MILLIGRAM(S): at 12:40

## 2020-06-29 NOTE — CONSULT NOTE ADULT - ATTENDING COMMENTS
uncontrolled HTN and vaginal bleeding   wean off cardene gtt, restart home BP med clonidine  f/u with gyne
Advanced care planning was discussed with patient and family.  Advanced care planning forms were reviewed and discussed as appropriate.  Differential diagnosis and plan of care discussed with patient after the evaluation.   Pain assessed and judicious use of narcotics when appropriate was discussed.  Importance of Fall prevention discussed.  Counseling on Smoking and Alcohol cessation was offered when appropriate.  Counseling on Diet, exercise, and medication compliance was done.
48 y/o  w/ heavy vaginal bleeding  and symptomatic anemia.  TVUS w/ evidence of intramural fibroid and adenomyosis.  Bleeding not currently heavy.  Pt currently with uncontrolled blood pressure in hypertensive urgency requiring IV drip antihypertensive.  To f/u for endometrial Bx and further management of abnormal bleeding.

## 2020-06-29 NOTE — H&P ADULT - ATTENDING COMMENTS
Pt reports continued vaginal bleeding.  No chest pain, lightheadedness, or headache.  BP now 126/77  Will monitor BP.    Pt currently lower than goal, but asymptomatic   Will f/u further gyn recs

## 2020-06-29 NOTE — H&P ADULT - HISTORY OF PRESENT ILLNESS
This is a 50yo F w/ PMHX of HTN and fibroids who presented to ED with complaints of vaginal bleeding, HA, blurry vision and dizziness. Pt reports she has had intermittent, moderately heavy vaginal bleeding for past month, hard to tell when her LMP was, but she thinks the 1st week of . Today, she developed heavy vaginal bleeding w/ large clots, soaking 12 pads in a matter of 6hrs. She was also having sharp abdominal and pelvic pain, worse than the pain she normally gets with mensuration. She denies having chest pain, palpitations, SOB, cough, sick contacts, fever, chills, N/V/D/C, dysuria, melena or hematochezia.     In ED, pt found to have BP up to 270/123, s/p labetalol 10mg IV x2, clonidine 0.5mg PO x1, and nifedipine gtt. SBP dropped to 80s, now in low 100s. s/p 1L bolus. EKbpm, NSR, TWI in avl, qtc: 422.  Hb.3-->10.7. Trops 20->18. Cr 2.07->1.75 (pt states last Cr outpatient was 1.6). US vaginal showed intramural uterine fibroid (3.6 x 2.8 x 2.7 cm), complex septated cyst of R ovary (1.7 x 1.8 x 1.5cm), and possible adenomyosis. CTH negative for ICH. EKbpm, NSR, TWI in avl, qtc: 422.  Trops 20->18. Cr 2.07->1.75 (pt states last Cr outpatient was 1.6). This is a 48yo F w/ PMHX of HTN and fibroids who presented to ED with complaints of vaginal bleeding, HA, blurry vision and dizziness. Pt reports she has had intermittent, moderately heavy vaginal bleeding for past month, hard to tell when her LMP was, but she thinks the 1st week of . Today, she developed heavy vaginal bleeding w/ large clots, soaking 12 pads in a matter of 6hrs. She was also having sharp abdominal and pelvic pain, worse than the pain she normally gets with mensuration. She denies having chest pain, palpitations, SOB, cough, sick contacts, fever, chills, N/V/D/C, dysuria, melena or hematochezia.     In ED, pt found to have BP up to 270/123, s/p labetalol 10mg IV x2, clonidine 0.5mg PO x1, and nifedipine gtt. SBP dropped to 90s. s/p 1L bolus. EKbpm, NSR, TWI in avl, qtc: 422.  Hb.3-->10.7. Trops 20->18. Cr 2.07->1.75 (pt states last Cr outpatient was 1.6). US vaginal showed intramural uterine fibroid (3.6 x 2.8 x 2.7 cm), complex septated cyst of R ovary (1.7 x 1.8 x 1.5cm), and possible adenomyosis. CTH negative for ICH. EKbpm, NSR, TWI in avl, qtc: 422.  Trops 20->18. Cr 2.07->1.75 (pt states last Cr outpatient was 1.6). This is a 48yo F w/ PMHX of HTN and fibroids who presented to ED with complaints of vaginal bleeding, HA, blurry vision and dizziness. Pt reports she has had intermittent, moderately heavy vaginal bleeding for past month, hard to tell when her LMP was, but she thinks the 1st week of . Today, she developed heavy vaginal bleeding w/ large clots, soaking 12 pads in a matter of 6hrs. She was also having sharp abdominal and pelvic pain, worse than the pain she normally gets with mensuration. She denies having chest pain, palpitations, SOB, cough, sick contacts, fever, chills, N/V/D/C, dysuria, melena or hematochezia.     In ED, pt found to have BP up to 270/123, s/p labetalol 10mg IV x2, clonidine 0.5mg PO x1, and nifedipine gtt. SBP dropped to 90s. s/p 1L bolus. EKbpm, NSR, TWI in avl, qtc: 422.  Hb.3-->10.7. Trops 20->18. Cr 2.07->1.75 (pt states last Cr outpatient was 1.6). US vaginal showed intramural uterine fibroid (3.6 x 2.8 x 2.7 cm), complex septated cyst of R ovary (1.7 x 1.8 x 1.5cm), and possible adenomyosis. CTH negative. EKbpm, NSR, TWI in avl, qtc: 422. This is a 48yo F w/ PMHX of HTN and fibroids who presented to ED with complaints of vaginal bleeding, HA, blurry vision and dizziness. Pt reports she has had intermittent, moderately heavy vaginal bleeding for past month, hard to tell when her LMP was, but she thinks the 1st week of . Today, she developed heavy vaginal bleeding w/ large clots, soaking 12 pads in a matter of 6hrs. She was also having sharp abdominal and pelvic pain, worse than the pain she normally gets with mensuration. She denies having chest pain, palpitations, SOB, cough, sick contacts, fever, chills, N/V/D/C, dysuria, melena or hematochezia.     In ED, pt found to have BP up to 270/123, s/p labetalol 10mg IV x2, clonidine 0.5mg PO x1, and nifedipine gtt. SBP dropped to 90s. s/p 1L bolus. EKbpm, NSR, TWI in avl, qtc: 422.  Hb.3-->10.7. Trops 20->18. Cr 2.07->1.75 (pt states last Cr outpatient was 1.6). US vaginal showed intramural uterine fibroid (3.6 x 2.8 x 2.7 cm), complex septated cyst of R ovary (1.7 x 1.8 x 1.5cm), and possible adenomyosis. CTH negative.

## 2020-06-29 NOTE — H&P ADULT - PROBLEM SELECTOR PLAN 4
- Likely elevated in setting of demand ischemia 2/2 to HTN Emergency  - Downtrending   - - Likely elevated in setting of demand ischemia 2/2 to HTN Emergency  - Downtrending   - Pt chest pain free

## 2020-06-29 NOTE — PHARMACOTHERAPY INTERVENTION NOTE - COMMENTS
Medication history is incomplete. Unable to verify patient's medication list with two sources. Discrepancies noted in provider handoff. Please call spectra [phone] if you have any questions.

## 2020-06-29 NOTE — H&P ADULT - PROBLEM SELECTOR PLAN 3
- Likely 2/2 to profound HTN   - Cr initially 2.07, improved to 1.75 after BP tx. Baseline 1.6  - Avoid nephrotoxic agents   - Monitor BMP daily

## 2020-06-29 NOTE — H&P ADULT - PROBLEM SELECTOR PLAN 2
- Hbg 11.3-->10.7  - Seen by GYN, appreciate recs  - Bleeding mostly resolved   - Possible EMB today vs. outpt - Hbg 11.3-->10.7  - Seen by GYN, appreciate recs  - Keep active T&S  - Monitor cbc closely   - Possible EMB today vs. outpt

## 2020-06-29 NOTE — CONSULT NOTE ADULT - ASSESSMENT
48 yo F w/ hx of HTN and fibroids, presenting with intermittent heavy vaginal bleeding for 1 month and acutely worsening today (using 12 pads over 6 hours and seeing blood clots). Pt reports the volume of blood loss was so much that the toilet flushed by itself. At the time, she reports experiencing lightheadedness, headache, and blurry vision. Pt notes that her periods are typically regular with moderate flow lasting 4 days, but this past month the bleeding started becoming irregular and intermittently heavy. She notes having been told she has fibroids in the past but was told it was the size of a sweet pea at the time. Endorses that she normally feels vaginal pain when she is menstruating, but pain was worse today.     In ED, pt found to have BP up to 270/123. She takes clonidine 0.5mg BID and norvastatin 100mg QD at home, states it easily goes up to 200s in the past but normally is in 130s systolic. US vaginal showed intramural uterine fibroid (3.6 x 2.8 x 2.7 cm), complex septated cyst of R ovary (1.7 x 1.8 x 1.5cm), and possible adenomyosis. CTH negative for ICH. EKG normal. Trops 20->18. Cr 2.07->1.75 (pt states last Cr outpatient was 1.6). Pt s/p labetalol 10mg IV x2, clonidine 0.5mg PO x1, and started on nifedipine gtt. Also received morphine 2mg IV x1 and tylenol 650mg x1 for her vaginal pain, which she endorses is now improved. Notes that headache has also improved to 1/10 but intermittently goes up to 5/10.

## 2020-06-29 NOTE — PROGRESS NOTE ADULT - PROBLEM SELECTOR PLAN 4
- Likely elevated in setting of demand ischemia 2/2 to HTN Emergency  - Downtrending   - Pt chest pain free  check echo

## 2020-06-29 NOTE — CONSULT NOTE ADULT - SUBJECTIVE AND OBJECTIVE BOX
CHIEF COMPLAINT:    HISTORY OF PRESENT ILLNESS:  48 yo F w/ hx of HTN and fibroids, presenting with intermittent heavy vaginal bleeding for 1 month and acutely worsening today (using 12 pads over 6 hours and seeing blood clots). Pt reports the volume of blood loss was so much that the toilet flushed by itself. At the time, she reports experiencing lightheadedness, headache, and blurry vision. Pt notes that her periods are typically regular with moderate flow lasting 4 days, but this past month the bleeding started becoming irregular and intermittently heavy. She notes having been told she has fibroids in the past but was told it was the size of a sweet pea at the time. Endorses that she normally feels vaginal pain when she is menstruating, but pain was worse today.     In ED, pt found to have BP up to 270/123. She takes clonidine 0.5mg BID and norvastatin 100mg QD at home, states it easily goes up to 200s in the past but normally is in 130s systolic. US vaginal showed intramural uterine fibroid (3.6 x 2.8 x 2.7 cm), complex septated cyst of R ovary (1.7 x 1.8 x 1.5cm), and possible adenomyosis. CTH negative for ICH. EKG normal. Trops 20->18. Cr 2.07->1.75 (pt states last Cr outpatient was 1.6). Pt s/p labetalol 10mg IV x2, clonidine 0.5mg PO x1, and started on nifedipine gtt. Also received morphine 2mg IV x1 and tylenol 650mg x1 for her vaginal pain, which she endorses is now improved. Notes that headache has also improved to 1/10 but intermittently goes up to 5/10.     Cardiology consulted for uncontrolled HTN. Pt endorsed mild headache and mild vaginal pain, but controlled by pain meds and feeling improved overall. States she is still bleeding vaginally, but not as much and notes that it intermittently gets better/worse. Denied f/c/n/v, CP, SOB, abdominal pain, hematochezia, melena, changes in vision, dizziness. BP now improved 154/89 (~30 mins after receiving clonidine).        PAST MEDICAL & SURGICAL HISTORY:  Fibroids  Hypertension  S/P           MEDICATIONS:  cloNIDine 0.1 milliGRAM(s) Oral two times a day              sodium chloride 0.9% lock flush 3 milliLiter(s) IV Push every 8 hours      FAMILY HISTORY:  FH: HTN (hypertension)      SOCIAL HISTORY:    [ ] Non-smoker  [ ] Smoker  [ ] Alcohol    Allergies    No Known Allergies    Intolerances    	    REVIEW OF SYSTEMS:  CONSTITUTIONAL: No fever, weight loss, +fatigue  EYES: No eye pain, visual disturbances, or discharge  ENMT:  No difficulty hearing, tinnitus, vertigo; No sinus or throat pain  NECK: No pain or stiffness  RESPIRATORY: No cough, wheezing, chills or hemoptysis; No Shortness of Breath  CARDIOVASCULAR: No chest pain, palpitations, passing out, dizziness, or leg swelling  GASTROINTESTINAL: No abdominal or epigastric pain. No nausea, vomiting, or hematemesis; No diarrhea or constipation. No melena or hematochezia.  GENITOURINARY: No dysuria, frequency, hematuria, or incontinence  NEUROLOGICAL: No headaches, memory loss, loss of strength, numbness, or tremors  SKIN: No itching, burning, rashes, or lesions   LYMPH Nodes: No enlarged glands  ENDOCRINE: No heat or cold intolerance; No hair loss  MUSCULOSKELETAL: No joint pain or swelling; No muscle, back, or extremity pain  PSYCHIATRIC: No depression, anxiety, mood swings, or difficulty sleeping  HEME/LYMPH: No easy bruising, or bleeding gums  ALLERY AND IMMUNOLOGIC: No hives or eczema	    [ ] All others negative	  [ ] Unable to obtain    PHYSICAL EXAM:  T(C): 36.6 (20 @ 09:49), Max: 37 (20 @ 20:09)  HR: 59 (20 @ 09:49) (56 - 81)  BP: 125/79 (20 @ 09:49) (100/60 - 270/123)  RR: 18 (20 @ 09:49) (12 - 18)  SpO2: 99% (20 @ 09:49) (99% - 100%)  Wt(kg): --  I&O's Summary      Appearance: Normal	  HEENT:   Normal oral mucosa, PERRL, EOMI	  Lymphatic: No lymphadenopathy  Cardiovascular: Normal S1 S2, No JVD, No murmurs, No edema  Respiratory: Lungs clear to auscultation	  Psychiatry: A & O x 3, Mood & affect appropriate  Gastrointestinal:  Soft, Non-tender, + BS	  Skin: No rashes, No ecchymoses, No cyanosis	  Neurologic: Non-focal  Extremities: Normal range of motion, No clubbing, cyanosis or edema  Vascular: Peripheral pulses palpable 2+ bilaterally    TELEMETRY: 	    ECG:  	NSR,  non specific stt changes   RADIOLOGY:  < from: CT Head No Cont (20 @ 23:22) >    EXAM:  CT BRAIN        PROCEDURE DATE:  2020         INTERPRETATION:  CLINICAL INFORMATION: Headache    TECHNIQUE:   Noncontrast CT of the head was performed.   Sagittal and coronal reformats were created.    COMPARISON STUDY: None    FINDINGS:     No acute intracranial hemorrhage or abnormal extra-axial collection. The basal cisterns are patent. Sulci and ventricles are within normal limits for the patient's age. No hydrocephalus. The calvarium is intact. The visualized paranasal sinuses and tympanomastoid cavities appear free of acute disease.     IMPRESSION:     No acute intracranial hemorrhage, mass effect, or midline shift.              ROGERS ROBERTS M.D., RADIOLOGY RESIDENT  This document has been electronically signed.  SHAYNA HULL M.D., ATTENDING RADIOLOGIST  This document has been electronically signed. 2020 11:59PM                  < end of copied text >    OTHER: 	  	  LABS:	 	    CARDIAC MARKERS:      Troponin T, High Sensitivity (20 @ 23:35)    Troponin T, High Sensitivity: 18: ---------------------***PLEASE NOTE***----------------------  Rapid changes upward or downward in high-sensitivity  troponin levels strongly suggest acute myocardial injury.  Hemolysis may falsely lower results. Renal impairment may  increase results.    Normal: <6 - 14 ng/L  Indeterminate: 15 - 51 ng/L  Elevated: >51 ng/L    Please see "[email or web address] on the [facility]  intranet for more information. ng/L                                10.0   6.86  )-----------( 248      ( 2020 06:47 )             32.1     -    140  |  110<H>  |  27<H>  ----------------------------<  119<H>  4.7   |  19<L>  |  1.89<H>    Ca    9.0      2020 06:47    TPro  6.5  /  Alb  3.6  /  TBili  < 0.2<L>  /  DBili  x   /  AST  11  /  ALT  8   /  AlkPhos  57      proBNP:   Lipid Profile:   HgA1c:   TSH: Thyroid Stimulating Hormone, Serum: 1.39 uIU/mL ( @ 06:47)

## 2020-06-29 NOTE — H&P ADULT - ASSESSMENT
This is a 50yo F w/ PMHX of HTN and fibroids who presented to ED with complaints of vaginal bleeding, HA, blurry vision and dizziness. Found have BP of 270/123, with ANNIE. Admitted to telemetry for HTN Emergency.

## 2020-06-29 NOTE — CONSULT NOTE ADULT - PROBLEM SELECTOR RECOMMENDATION 9
Cont with clonidine   Check TTE   Monitor closely
-Given current blood pressures, acute management priority should be controlling hypertension  -Pt can have EMB tomorrow or outpatient if prefers with [facility]  -Repeat H/H stable, no evidence of acute hemorrhage due to vaginal bleeding  -Pain control w/ tylenol, avoid motrin given elevated creatinine    d/w Dr. Yazmin Mckay PGY2

## 2020-06-29 NOTE — CONSULT NOTE ADULT - SUBJECTIVE AND OBJECTIVE BOX
HPI:  Ms. Madrid is a  49 year-old woman with history of hypertension, chronic kidney disease, and fibroids, who presented overnight to the [facility] ER with heavy vaginal bleeding and sharp abdominal/pelvic pain for 1 day, in the setting of intermittent vaginal bleeding for 1 month. She complained as well of blurry vision, headache, and dizziness. Her blood pressure in the ER was 270/123; she received Labetalol 10mg iv x 2, Clonidine 0.5mg po x1, and was placed on a Nifedipine gtt. Her SBP dropped to 90mmHg, at which point she received a 1-liter NS bolus. She was noted in the ER to have a creatinine of 2.07; in light of the hypertension and azotemia, a renal consultation was requested.     Ms. Madrid shares that her last outpatient creatinine was 1.6mg/dL.           PAST MEDICAL & SURGICAL HISTORY:  Fibroids  Hypertension  S/P     MEDICATIONS  (STANDING):  cloNIDine 0.1 milliGRAM(s) Oral two times a day  sodium chloride 0.9% lock flush 3 milliLiter(s) IV Push every 8 hours    Allergies  No Known Allergies    SOCIAL HISTORY:  Denies ETOh,Smoking,     FAMILY HISTORY:  FH: HTN (hypertension)    REVIEW OF SYSTEMS:  CONSTITUTIONAL: No weakness, fevers or chills  EYES/ENT: No visual changes;  No vertigo or throat pain   NECK: No pain or stiffness  RESPIRATORY: No cough, wheezing, hemoptysis; No shortness of breath  CARDIOVASCULAR: No chest pain or palpitations  GASTROINTESTINAL: No abdominal or epigastric pain. No nausea, vomiting, or hematemesis; No diarrhea or constipation. No melena or hematochezia.  GENITOURINARY: No dysuria, frequency or hematuria  NEUROLOGICAL: No numbness or weakness  SKIN: No itching, burning, rashes, or lesions   All other review of systems is negative unless indicated above.    VITAL:  T(C): , Max: 37 (20 @ 20:09)  T(F): , Max: 98.6 (20 @ 20:09)  HR: 59 (20 @ 09:49)  BP: 125/79 (20 @ 09:49)  BP(mean): 135 (20 @ 01:30)  RR: 18 (20 @ 09:49)  SpO2: 99% (20 @ 09:49)    PHYSICAL EXAM:  Constitutional: NAD, Alert  HEENT: NCAT, MMM  Neck: Supple, No JVD  Respiratory: CTA-b/l  Cardiovascular: RRR s1s2, no m/r/g  Gastrointestinal: BS+, soft, NT/ND  Extremities: No peripheral edema b/l  Neurological: no focal deficits; strength grossly intact  Back: no CVAT b/l  Skin: No rashes, no nevi    LABS:                        10.0   6.86  )-----------( 248      ( 2020 06:47 )             32.1     Na(140)/K(4.7)/Cl(110)/HCO3(19)/BUN(27)/Cr(1.89)Glu(119)/Ca(9.0)/Mg(--)/PO4(--)     @ 06:47  Na(140)/K(4.3)/Cl(111)/HCO3(15)/BUN(28)/Cr(1.75)Glu(92)/Ca(9.1)/Mg(--)/PO4(--)     @ 23:35  Na(142)/K(4.0)/Cl(109)/HCO3(18)/BUN(30)/Cr(2.07)Glu(97)/Ca(9.9)/Mg(--)/PO4(--)     @ 20:42    Urinalysis Basic - ( 2020 21:00 )  Color: COLORLESS / Appearance: CLEAR / S.016 / pH: 6.0  Gluc: NEGATIVE / Ketone: NEGATIVE  / Bili: NEGATIVE / Urobili: NORMAL   Blood: MODERATE / Protein: 200 / Nitrite: NEGATIVE   Leuk Esterase: NEGATIVE / RBC: >50 / WBC 3-5   Sq Epi: OCC / Non Sq Epi: x / Bacteria: NEGATIVE      IMAGING:  < from: CT Head No Cont (20 @ 23:22) >  No acute intracranial hemorrhage, mass effect, or midline shift.      ASSESSMENT:      RECOMMEND:      Thank you for involving [facility] in this patient's care.    With warm regards,    Espinoza Nguyen MD   [facility]  Office: [phone]  Cell: [phone] HPI:  Ms. Madrid is a  49 year-old woman with history of hypertension, chronic kidney disease, and fibroids, who presented overnight to the [facility] ER with heavy vaginal bleeding and sharp abdominal/pelvic pain for 1 day, in the setting of intermittent vaginal bleeding for 1 month. She complained as well of blurry vision, headache, and dizziness. Her blood pressure in the ER was 270/123; she received Labetalol 10mg iv x 2, Clonidine 0.5mg po x1, and was placed on a Nifedipine gtt. Her SBP dropped to 90mmHg, at which point she received a 1-liter NS bolus. She was noted in the ER to have a creatinine of 2.07; in light of the hypertension and azotemia, a renal consultation was requested.     Ms. Madrid shares that her last outpatient creatinine was 1.6mg/dL.           PAST MEDICAL & SURGICAL HISTORY:  Fibroids  Hypertension  S/P     MEDICATIONS  (STANDING):  cloNIDine 0.1 milliGRAM(s) Oral two times a day  sodium chloride 0.9% lock flush 3 milliLiter(s) IV Push every 8 hours    Allergies  No Known Allergies    SOCIAL HISTORY:  Denies ETOh,Smoking,     FAMILY HISTORY:  FH: HTN (hypertension)    REVIEW OF SYSTEMS:  CONSTITUTIONAL: No weakness, fevers or chills  EYES/ENT: No visual changes;  No vertigo or throat pain   NECK: No pain or stiffness  RESPIRATORY: No cough, wheezing, hemoptysis; No shortness of breath  CARDIOVASCULAR: No chest pain or palpitations  GASTROINTESTINAL: No abdominal or epigastric pain. No nausea, vomiting, or hematemesis; No diarrhea or constipation. No melena or hematochezia.  GENITOURINARY: No dysuria, frequency or hematuria  NEUROLOGICAL: No numbness or weakness  SKIN: No itching, burning, rashes, or lesions   All other review of systems is negative unless indicated above.    VITAL:  T(C): , Max: 37 (20 @ 20:09)  T(F): , Max: 98.6 (20 @ 20:09)  HR: 59 (20 @ 09:49)  BP: 125/79 (20 @ 09:49)  BP(mean): 135 (20 @ 01:30)  RR: 18 (20 @ 09:49)  SpO2: 99% (20 @ 09:49)    PHYSICAL EXAM:  Constitutional: NAD, Alert  HEENT: NCAT, MMM  Neck: Supple, No JVD  Respiratory: CTA-b/l  Cardiovascular: RRR s1s2, no m/r/g  Gastrointestinal: BS+, soft, NT/ND  Extremities: No peripheral edema b/l  Neurological: no focal deficits; strength grossly intact  Back: no CVAT b/l  Skin: No rashes, no nevi    LABS:                        10.0   6.86  )-----------( 248      ( 2020 06:47 )             32.1     Na(140)/K(4.7)/Cl(110)/HCO3(19)/BUN(27)/Cr(1.89)Glu(119)/Ca(9.0)/Mg(--)/PO4(--)     @ 06:47  Na(140)/K(4.3)/Cl(111)/HCO3(15)/BUN(28)/Cr(1.75)Glu(92)/Ca(9.1)/Mg(--)/PO4(--)     @ 23:35  Na(142)/K(4.0)/Cl(109)/HCO3(18)/BUN(30)/Cr(2.07)Glu(97)/Ca(9.9)/Mg(--)/PO4(--)     @ 20:42    Urinalysis Basic - ( 2020 21:00 )  Color: COLORLESS / Appearance: CLEAR / S.016 / pH: 6.0  Gluc: NEGATIVE / Ketone: NEGATIVE  / Bili: NEGATIVE / Urobili: NORMAL   Blood: MODERATE / Protein: 200 / Nitrite: NEGATIVE   Leuk Esterase: NEGATIVE / RBC: >50 / WBC 3-5   Sq Epi: OCC / Non Sq Epi: x / Bacteria: NEGATIVE    (2018) - BUN/creatinine: 26/1.75  (2016) -  BUN/creatinine: 33/1.28      IMAGING:  < from: CT Head No Cont (20 @ 23:22) >  No acute intracranial hemorrhage, mass effect, or midline shift.      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 Hypertensive nephrosclerosis? Chronic GN?  (2)Metabolic acidosis - mild - renally mediated  (3)HTN - hypertensive emergency on admission (presumed papilledema) - presently on montherapy with Clonidine 0.1 bid    RECOMMEND:      Thank you for involving [facility] in this patient's care.    With warm regards,    Espinoza Nguyen MD   [facility]  Office: [phone]  Cell: [phone] HPI:  Ms. Madrid is a  49 year-old woman with history of hypertension, chronic kidney disease, and fibroids, who presented overnight to the [facility] ER with heavy vaginal bleeding and sharp abdominal/pelvic pain for 1 day, in the setting of intermittent vaginal bleeding for 1 month. She complained as well of blurry vision, headache, and dizziness. Her blood pressure in the ER was 270/123; she received Labetalol 10mg iv x 2, Clonidine 0.5mg po x1, and was placed on a Nifedipine gtt. Her SBP dropped to 90mmHg, at which point she received a 1-liter NS bolus. She was noted in the ER to have a creatinine of 2.07; in light of the hypertension and azotemia, a renal consultation was requested.     Ms. Madrid shares that her last outpatient creatinine was 1.6mg/dL. At home, for hypertension, she takes Clonidine 0.1mg po bid, Procardia XL 90mg po qd, and Avapro 300mg po qd.          PAST MEDICAL & SURGICAL HISTORY:  Fibroids  Hypertension  S/P     MEDICATIONS  (STANDING):  cloNIDine 0.1 milliGRAM(s) Oral two times a day  sodium chloride 0.9% lock flush 3 milliLiter(s) IV Push every 8 hours    Allergies  No Known Allergies    SOCIAL HISTORY:  Denies ETOh,Smoking,     FAMILY HISTORY:  FH: HTN (hypertension)    REVIEW OF SYSTEMS:  CONSTITUTIONAL: No weakness, fevers or chills  EYES/ENT: No visual changes;  No vertigo or throat pain   NECK: No pain or stiffness  RESPIRATORY: No cough, wheezing, hemoptysis; No shortness of breath  CARDIOVASCULAR: No chest pain or palpitations  GASTROINTESTINAL: No abdominal or epigastric pain. No nausea, vomiting, or hematemesis; No diarrhea or constipation. No melena or hematochezia.  GENITOURINARY: No dysuria, frequency or hematuria  NEUROLOGICAL: No numbness or weakness  SKIN: No itching, burning, rashes, or lesions   All other review of systems is negative unless indicated above.    VITAL:  T(C): , Max: 37 (20 @ 20:09)  T(F): , Max: 98.6 (20 @ 20:09)  HR: 59 (20 @ 09:49)  BP: 125/79 (20 @ 09:49)  BP(mean): 135 (20 @ 01:30)  RR: 18 (20 @ 09:49)  SpO2: 99% (20 @ 09:49)    PHYSICAL EXAM:  Constitutional: NAD, Alert  HEENT: NCAT, MMM  Neck: Supple, No JVD  Respiratory: CTA-b/l  Cardiovascular: RRR s1s2, no m/r/g  Gastrointestinal: BS+, soft, NT/ND  Extremities: No peripheral edema b/l  Neurological: no focal deficits; strength grossly intact  Back: no CVAT b/l  Skin: No rashes, no nevi    LABS:                        10.0   6.86  )-----------( 248      ( 2020 06:47 )             32.1     Na(140)/K(4.7)/Cl(110)/HCO3(19)/BUN(27)/Cr(1.89)Glu(119)/Ca(9.0)/Mg(--)/PO4(--)     @ 06:47  Na(140)/K(4.3)/Cl(111)/HCO3(15)/BUN(28)/Cr(1.75)Glu(92)/Ca(9.1)/Mg(--)/PO4(--)     @ 23:35  Na(142)/K(4.0)/Cl(109)/HCO3(18)/BUN(30)/Cr(2.07)Glu(97)/Ca(9.9)/Mg(--)/PO4(--)     @ 20:42    Urinalysis Basic - ( 2020 21:00 )  Color: COLORLESS / Appearance: CLEAR / S.016 / pH: 6.0  Gluc: NEGATIVE / Ketone: NEGATIVE  / Bili: NEGATIVE / Urobili: NORMAL   Blood: MODERATE / Protein: 200 / Nitrite: NEGATIVE   Leuk Esterase: NEGATIVE / RBC: >50 / WBC 3-5   Sq Epi: OCC / Non Sq Epi: x / Bacteria: NEGATIVE    (2018) - BUN/creatinine: 26/1.75  (2016) -  BUN/creatinine: 33/1.28      IMAGING:  < from: CT Head No Cont (20 @ 23:22) >  No acute intracranial hemorrhage, mass effect, or midline shift.      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 Hypertensive nephrosclerosis? Chronic GN?  (2)Metabolic acidosis - mild - renally mediated  (3)HTN - hypertensive emergency on admission (presumed papilledema) - presently on montherapy with Clonidine 0.1 bid    RECOMMEND:      Thank you for involving [facility] in this patient's care.    With warm regards,    Espinoza Nguyen MD   [facility]  Office: [phone]  Cell: [phone] HPI: Ms. Madrid is a  49 year-old woman with history of hypertension, chronic kidney disease, and fibroids, who presented overnight to the [facility] ER with heavy vaginal bleeding and sharp abdominal/pelvic pain for 1 day, in the setting of intermittent vaginal bleeding for 1 month. She complained as well of blurry vision, headache, and dizziness. Her blood pressure in the ER was 270/123; she received Labetalol 10mg iv x 2, Clonidine 0.5mg po x1, and was placed on a Nifedipine gtt. Her SBP dropped to 90mmHg, at which point she received a 1-liter NS bolus. She was noted in the ER to have a creatinine of 2.07; in light of the hypertension and azotemia, a renal consultation was requested.     Ms. Madrid shares that her last outpatient creatinine was 1.6mg/dL. She follows with nephrologist Dr. Hollingsworth. She denies ever having had a renal biopsy. She has been told that her kidney disease is due to hypertension.    At home, for hypertension, she takes Clonidine 0.1mg po bid and Irbesartan 300mg po qd; she does not take Procardia. She states that she checks her blood pressure relatively frequently at home; it fluctuates but most often her systolic BP is in the 130s-150s. On rare occasions it will hit the 180-200 range. It has never come close to 270mmHg. She shares that at one point she was on a diuretic, and her blood pressure was significantly improved; however, the diuretic was later discontinued because of the concern that it would hurt her kidneys.        PAST MEDICAL & SURGICAL HISTORY:  Fibroids  Hypertension  CKD  S/P     MEDICATIONS  (STANDING):  cloNIDine 0.1 milliGRAM(s) Oral two times a day  sodium chloride 0.9% lock flush 3 milliLiter(s) IV Push every 8 hours    Allergies  No Known Allergies    SOCIAL HISTORY:  Denies ETOh,Smoking,     FAMILY HISTORY:  FH: HTN (hypertension)    REVIEW OF SYSTEMS:  CONSTITUTIONAL: No weakness, fevers or chills  EYES/ENT: (+)blurry vision  NECK: No pain or stiffness  RESPIRATORY: No cough, wheezing, hemoptysis; No shortness of breath  CARDIOVASCULAR: No chest pain or palpitations; (+)dizziness  GASTROINTESTINAL: (+)abdominal pain  GENITOURINARY: (+)vaginal bleeding  NEUROLOGICAL: No numbness or weakness  SKIN: No itching, burning, rashes, or lesions   All other review of systems is negative unless indicated above.    VITAL:  T(C): , Max: 37 (20 @ 20:09)  T(F): , Max: 98.6 (20 @ 20:09)  HR: 59 (20 @ 09:49)  BP: 125/79 (20 @ 09:49)  BP(mean): 135 (20 @ 01:30)  RR: 18 (20 @ 09:49)  SpO2: 99% (20 @ 09:49)    PHYSICAL EXAM:  Constitutional: NAD, Alert  HEENT: NCAT, MMM  Neck: Supple, No JVD  Respiratory: CTA-b/l  Cardiovascular: RRR s1s2, no m/r/g  Gastrointestinal: BS+, soft, NT/ND  Extremities: No peripheral edema b/l  Neurological: no focal deficits; strength grossly intact  Back: no CVAT b/l  Skin: No rashes, no nevi    LABS:                        10.0   6.86  )-----------( 248      ( 2020 06:47 )             32.1     Na(140)/K(4.7)/Cl(110)/HCO3(19)/BUN(27)/Cr(1.89)Glu(119)/Ca(9.0)/Mg(--)/PO4(--)     @ 06:47  Na(140)/K(4.3)/Cl(111)/HCO3(15)/BUN(28)/Cr(1.75)Glu(92)/Ca(9.1)/Mg(--)/PO4(--)     @ 23:35  Na(142)/K(4.0)/Cl(109)/HCO3(18)/BUN(30)/Cr(2.07)Glu(97)/Ca(9.9)/Mg(--)/PO4(--)     @ 20:42    Urinalysis Basic - ( 2020 21:00 )  Color: COLORLESS / Appearance: CLEAR / S.016 / pH: 6.0  Gluc: NEGATIVE / Ketone: NEGATIVE  / Bili: NEGATIVE / Urobili: NORMAL   Blood: MODERATE / Protein: 200 / Nitrite: NEGATIVE   Leuk Esterase: NEGATIVE / RBC: >50 / WBC 3-5   Sq Epi: OCC / Non Sq Epi: x / Bacteria: NEGATIVE    (2018) - BUN/creatinine: 26/1.75  (2016) -  BUN/creatinine: 33/1.28      IMAGING:  < from: CT Head No Cont (20 @ 23:22) >  No acute intracranial hemorrhage, mass effect, or midline shift.      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 Hypertensive nephrosclerosis? Chronic GN? She had proteinuria and blood on her urinalysis, but the urinalysis almost certainly was impacted by her vaginal bleeding. Regardless, even if she has a chronic glomerulonephritis, I doubt that immunosuppression would be of much benefit at this point...the underlying process impacting her kidneys has been around for years....and creatinine appears to be relatively stable since 2018. Risk>benefit of renal biopsy.    (2)Metabolic acidosis - mild - renally mediated    (3)HTN - hypertensive emergency on admission (presumed papilledema) - presently on montherapy with Clonidine 0.1 bid. Taking Irbersartan at home; given that her renal function is stable, we can add back ARB at this point.     RECOMMEND:  (1)Cozaar 50mg po daily; 1st dose this pm; Clonidine as ordered and 0.1mg po q4h prn SBP>180 and/or DBP>110  (2)If systolic BP trends up above 160 as of tomorrow, I would add HCTZ 25mg po qd at that point  (3)No urine studies for now (given that they would likely be impacted by the vaginal bleeding)  (4)Meds for GFR 30ml/min (present dosing is acceptable)  (5)ANCA, JESSICA, Anti-GBM, C3, C4      Thank you for involving [facility] in this patient's care.    With warm regards,    Espinoza Nguyen MD   [facility]  Office: [phone]  Cell: [phone]

## 2020-06-29 NOTE — CONSULT NOTE ADULT - ASSESSMENT
50 yo F w/ hx of HTN and fibroids, presenting with 1 month long intermittent heavy vaginal bleeding, acutely worsening today and associated with lightheadedness, headache, and blurry vision. Found to be hypertensive to 270/123 in ED. s/p labetalol 10mg IV x2, clonidine 0.5mg PO x1, and started on nifedipine gtt. MICU consulted for concern of HTN emergency. On assessment, repeat BP down to 154/89 and pt reporting improvement in symptoms. Cr downtrended from 2.07 to 1.75, which may be patient's baseline; otherwise no end organ damage suggested on labs.    #HTN 50 yo F w/ hx of HTN and fibroids, presenting with 1 month long intermittent heavy vaginal bleeding, acutely worsening today and associated with lightheadedness, headache, and blurry vision. Found to be hypertensive to 270/123 in ED. CTH negative. s/p labetalol 10mg IV x2, clonidine 0.5mg PO x1, and started on nifedipine gtt. MICU consulted for concern of HTN emergency. On assessment, repeat BP down to 154/89 and pt reporting improvement in symptoms. Cr downtrended from 2.07 to 1.75, which may be patient's baseline; otherwise no end organ damage suggested on labs.    #HTN urgency vs emergency  - Pt now with minimal symptoms and BP down to 154/89  - continue to monitor BP and for worsening of symptoms  - wean off nifedipine gtt as tolerated  - c/w home meds and recommend close outpatient follow-up for optimization BP med regimen  - f/u OB/GYN recs for vaginal bleeding  - Not a candidate for MICU at this time. Please re-consult if needed.    Discussed with MICU attending, Dr. Fisher.    Chucho Bradshaw, PGY-2  MICU Resident  Contact/Pager: [phone] / [phone] 50 yo F w/ hx of HTN and fibroids, presenting with 1 month long intermittent heavy vaginal bleeding, acutely worsening today and associated with lightheadedness, headache, and blurry vision. Found to be hypertensive to 270/123 in ED. CTH negative. s/p labetalol 10mg IV x2, clonidine 0.5mg PO x1, and started on nifedipine gtt. MICU consulted for uncontrolled HTN. On assessment, repeat BP down to 154/89 and pt reporting improvement in symptoms. Cr downtrended from 2.07 to 1.75, which may be patient's baseline; otherwise no end organ damage suggested on labs.    #Uncontrolled HTN  - Pt now with minimal symptoms and BP down to 154/89  - continue to monitor BP and for worsening of symptoms  - wean off nifedipine gtt as tolerated  - c/w home meds and recommend close outpatient follow-up for optimization BP med regimen  - f/u OB/GYN recs for vaginal bleeding  - Not a candidate for MICU at this time. Please re-consult if needed.    Discussed with MICU attending, Dr. Fisher.    Chucho Bradshaw, PGY-2  MICU Resident  Contact/Pager: [phone] / [phone]

## 2020-06-29 NOTE — PROGRESS NOTE ADULT - PROBLEM SELECTOR PLAN 1
- Admit to telemetry    Seen by MICU, not a candidate.   - Goal SBP of 140-160 for next 24hrs   -  q4 Neuro checks  - q4 vitals, monitor BP closely  cards roxy noted  cont clonidine for now. monitor closely

## 2020-06-29 NOTE — H&P ADULT - NSHPSOCIALHISTORY_GEN_ALL_CORE
Pt works as a mental health counselor.  Lives at home with her kids.   No tobacco, alcohol or illicit drug use.

## 2020-06-29 NOTE — H&P ADULT - PROBLEM SELECTOR PLAN 1
- Admit to telemetry   - SPB high of 270, was overcorrected s/p clonidine, labetalol and nifedipine gtt. SBP was as low as 90s, now 140s. Seen by MICU, not a candidate.   - Goal SBP of 200 for next 24hrs   - Symptoms have since resolved with BP treatment   - s/p 1L NS bolus  - Holding off on further antihypertensive medications for now  - q4 Neuro checks  - q4 vitals, monitor BP closely - Admit to telemetry   - SPB high of 270, was overcorrected s/p clonidine, labetalol and nifedipine gtt. SBP was as low as 90s, now 140s. Seen by MICU, not a candidate.   - s/p 1L NS bolus  - Symptoms have since resolved with BP treatment   - Goal SBP of 140-160 for next 24hrs   - Holding off on further antihypertensive medications for now  - q4 Neuro checks  - q4 vitals, monitor BP closely    Case and plan discussed with Dr. Dupont - Admit to telemetry   - SPB high of 270, was overcorrected s/p clonidine, labetalol and nifedipine gtt. SBP was as low as 90s, now 140s. Seen by MICU, not a candidate.   - s/p 1L NS bolus  - Symptoms have since resolved with BP treatment   - Goal SBP of 140-160 for next 24hrs   - Holding off on further antihypertensive medications for now  - q4 Neuro checks  - q4 vitals, monitor BP closely

## 2020-06-29 NOTE — H&P ADULT - NSHPLABSRESULTS_GEN_ALL_CORE
10.7   7.08  )-----------( 269      ( 28 Jun 2020 23:35 )             34.1   06-28    140  |  111<H>  |  28<H>  ----------------------------<  92  4.3   |  15<L>  |  1.75<H>    Ca    9.1      28 Jun 2020 23:35    TPro  7.3  /  Alb  3.9  /  TBili  < 0.2<L>  /  DBili  x   /  AST  13  /  ALT  6   /  AlkPhos  73  06-28    Trops: 20-->18    < from: US Transvaginal (06.28.20 @ 21:37) >    FINDINGS:    Uterus: Measures 10.9 x 5.9 x 8.6 cm. and demonstrates a heterogeneous echotexture 3.6 x 2.8 x 2.7 cm intramural uterine fibroid suggested. In addition, there is increased echotexture of the central uterine region which is nonspecific, however may be seen with adenomyosis.  Endometrium: 7 mm. Within normal limits.    Right ovary: 2.7 x 2.7 x 2.1 cm. Within normal limits. Normal arterial waveforms. Complex septated cyst measuring 1.7 x 1.8 x 1.5 cm.  Left ovary: Not visualized on this exam.    Fluid: None.    IMPRESSION:  1. There is a 3.6 x 2.8 x 2.7 cm intramural uterine fibroid.  2. There is also a 1.7 x 1.8 x 1.5 cm complex septated cyst of the right ovary. Follow-up ultrasound can be obtained in 2 cycles to document resolution/stability.  3. Correlate with possible adenomyosis.  4. Left ovary was not visualized on this exam.    < end of copied text >    < from: CT Head No Cont (06.28.20 @ 23:22) >      FINDINGS:     No acute intracranial hemorrhage or abnormal extra-axial collection. The basal cisterns are patent. Sulci and ventricles are within normal limits for the patient's age. No hydrocephalus. The calvarium is intact. The visualized paranasal sinuses and tympanomastoid cavities appear free of acute disease.     IMPRESSION:     No acute intracranial hemorrhage, mass effect, or midline shift.    < end of copied text > 10.7   7.08  )-----------( 269      ( 2020 23:35 )             34.1       140  |  111<H>  |  28<H>  ----------------------------<  92  4.3   |  15<L>  |  1.75<H>    Ca    9.1      2020 23:35    TPro  7.3  /  Alb  3.9  /  TBili  < 0.2<L>  /  DBili  x   /  AST  13  /  ALT  6   /  AlkPhos  73      Trops: 20-->18    < from: US Transvaginal (20 @ 21:37) >    FINDINGS:    Uterus: Measures 10.9 x 5.9 x 8.6 cm. and demonstrates a heterogeneous echotexture 3.6 x 2.8 x 2.7 cm intramural uterine fibroid suggested. In addition, there is increased echotexture of the central uterine region which is nonspecific, however may be seen with adenomyosis.  Endometrium: 7 mm. Within normal limits.    Right ovary: 2.7 x 2.7 x 2.1 cm. Within normal limits. Normal arterial waveforms. Complex septated cyst measuring 1.7 x 1.8 x 1.5 cm.  Left ovary: Not visualized on this exam.    Fluid: None.    IMPRESSION:  1. There is a 3.6 x 2.8 x 2.7 cm intramural uterine fibroid.  2. There is also a 1.7 x 1.8 x 1.5 cm complex septated cyst of the right ovary. Follow-up ultrasound can be obtained in 2 cycles to document resolution/stability.  3. Correlate with possible adenomyosis.  4. Left ovary was not visualized on this exam.    < end of copied text >    < from: CT Head No Cont (20 @ 23:22) >      FINDINGS:     No acute intracranial hemorrhage or abnormal extra-axial collection. The basal cisterns are patent. Sulci and ventricles are within normal limits for the patient's age. No hydrocephalus. The calvarium is intact. The visualized paranasal sinuses and tympanomastoid cavities appear free of acute disease.     IMPRESSION:     No acute intracranial hemorrhage, mass effect, or midline shift.    < end of copied text >     EKbpm, NSR, TWI in avl, qtc: 422.

## 2020-06-29 NOTE — CONSULT NOTE ADULT - SUBJECTIVE AND OBJECTIVE BOX
CHIEF COMPLAINT: Vaginal bleeding, elevated BP    HPI:  48 yo F w/ hx of HTN and fibroids, presenting with intermittent heavy vaginal bleeding for 1 month and acutely worsening today (using 12 pads over 6 hours and seeing blood clots). Pt reports the volume of blood loss was so much that the toilet flushed by itself. At the time, she reports experiencing lightheadedness, headache, and blurry vision. Pt notes that her periods are typically regular with moderate flow lasting 4 days, but this past month the bleeding started becoming irregular and intermittently heavy. She notes having been told she has fibroids in the past but was told it was the size of a sweet pea at the time. Endorses that she normally feels vaginal pain when she is menstruating, but pain was worse today.     In ED, pt found to have BP up to 270/123. She takes clonidine 0.5mg BID and norvastatin 100mg QD at home, states it easily goes up to 200s in the past but normally is in 130s systolic. US vaginal showed intramural uterine fibroid (3.6 x 2.8 x 2.7 cm), complex septated cyst of R ovary (1.7 x 1.8 x 1.5cm), and possible adenomyosis. CTH negative for ICH. EKG normal. Trops 20->18. Cr 2.07->1.75 (pt states last Cr outpatient was 1.6). Pt s/p labetalol 10mg IV x2, clonidine 0.5mg PO x1, and started on nifedipine gtt. Also received morphine 2mg IV x1 and tylenol 650mg x1 for her vaginal pain, which she endorses is now improved. Notes that headache has also improved to 1/10 but intermittently goes up to 5/10.     MICU consulted for HTN. Pt was seen and examined at bedside. Pt endorsed mild headache and mild vaginal pain, but controlled by pain meds and feeling improved overall. States she is still bleeding vaginally, but not as much and notes that it intermittently gets better/worse. Denied f/c/n/v, CP, SOB, abdominal pain, hematochezia, melena, changes in vision, dizziness. BP check at the time was 154/89 (~30 mins after receiving clonidine).      PAST MEDICAL & SURGICAL HISTORY:  Hypertension  Hx of Fibroids    FAMILY HISTORY:  DM in father  HTN and CVA in grandmother  Renal failure in sister    SOCIAL HISTORY:  Smoking: Denies  EtOH Use: Denies    Allergies    No Known Allergies    Intolerances        HOME MEDICATIONS:  clonadine 0.5 BID  norvastatin 100 mg QD    REVIEW OF SYSTEMS:    CONSTITUTIONAL: +headache, improved; No weakness, fevers or chills  EYES/ENT: had blurry vision before but resolved; No visual changes;  No vertigo or throat pain   NECK: No pain or stiffness  RESPIRATORY: No cough, wheezing, hemoptysis; No shortness of breath  CARDIOVASCULAR: No chest pain or palpitations  GASTROINTESTINAL: No abdominal or epigastric pain. No nausea, vomiting, or hematemesis; No diarrhea or constipation. No melena or hematochezia.  GENITOURINARY: +vaginal pain, improved; No dysuria, frequency or hematuria  NEUROLOGICAL: No numbness or weakness  SKIN: No itching, burning, rashes, or lesions   All other review of systems is negative unless indicated above.    OBJECTIVE:  ICU Vital Signs Last 24 Hrs  T(C): 37 (2020 21:10), Max: 37 (2020 20:09)  T(F): 98.6 (2020 21:10), Max: 98.6 (2020 20:09)  HR: 76 (2020 02:31) (62 - 81)  BP: 154/86 (2020 02:31) (154/86 - 270/123)  BP(mean): 135 (2020 01:30) (114 - 188)  ABP: --  ABP(mean): --  RR: 18 (2020 02:31) (12 - 18)  SpO2: 100% (2020 02:31) (100% - 100%)        CAPILLARY BLOOD GLUCOSE          PHYSICAL EXAM:  GENERAL: NAD, well-developed, obese woman laying comfortably in bed, appearing slightly anxious  HEENT: PERRL, conjunctiva and sclera clear  CHEST/LUNG: Clear to auscultation bilaterally; No wheezes or rales  HEART: Regular rate and rhythm; No murmurs, rubs, or gallops  ABDOMEN: Soft, Nontender, Nondistended; Bowel sounds present  EXTREMITIES:  2+ Peripheral Pulses, No clubbing, cyanosis, or edema  PSYCH: AAOx3  NEUROLOGY: non-focal  SKIN: No rashes or lesions    HOSPITAL MEDICATIONS:  MEDICATIONS  (STANDING):  niCARdipine Infusion 5 mG/Hr (25 mL/Hr) IV Continuous <Continuous>    MEDICATIONS  (PRN):      LABS:                        10.7   7.08  )-----------( 269      ( 2020 23:35 )             34.1         140  |  111<H>  |  28<H>  ----------------------------<  92  4.3   |  15<L>  |  1.75<H>    Ca    9.1      2020 23:35    TPro  7.3  /  Alb  3.9  /  TBili  < 0.2<L>  /  DBili  x   /  AST  13  /  ALT  6   /  AlkPhos  73      PT/INR - ( 2020 20:42 )   PT: 11.8 SEC;   INR: 1.03          PTT - ( 2020 20:42 )  PTT:30.8 SEC  Urinalysis Basic - ( 2020 21:00 )    Color: COLORLESS / Appearance: CLEAR / S.016 / pH: 6.0  Gluc: NEGATIVE / Ketone: NEGATIVE  / Bili: NEGATIVE / Urobili: NORMAL   Blood: MODERATE / Protein: 200 / Nitrite: NEGATIVE   Leuk Esterase: NEGATIVE / RBC: >50 / WBC 3-5   Sq Epi: OCC / Non Sq Epi: x / Bacteria: NEGATIVE            MICROBIOLOGY:     RADIOLOGY:  < from: US Transvaginal (20 @ 21:37) >  IMPRESSION:  1. There is a 3.6 x 2.8 x 2.7 cm intramural uterine fibroid.  2. There is also a 1.7 x 1.8 x 1.5 cm complex septated cyst of the right ovary. Follow-up ultrasound can be obtained in 2 cycles to document resolution/stability.  3. Correlate with possible adenomyosis.  4. Left ovary was not visualized on this exam.    < end of copied text >      EKG: NSR, QTc 422 CHIEF COMPLAINT: Vaginal bleeding, elevated BP    HPI:  48 yo F w/ hx of HTN and fibroids, presenting with intermittent heavy vaginal bleeding for 1 month and acutely worsening today (using 12 pads over 6 hours and seeing blood clots). Pt reports the volume of blood loss was so much that the toilet flushed by itself. At the time, she reports experiencing lightheadedness, headache, and blurry vision. Pt notes that her periods are typically regular with moderate flow lasting 4 days, but this past month the bleeding started becoming irregular and intermittently heavy. She notes having been told she has fibroids in the past but was told it was the size of a sweet pea at the time. Endorses that she normally feels vaginal pain when she is menstruating, but pain was worse today.     In ED, pt found to have BP up to 270/123. She takes clonidine 0.5mg BID and norvastatin 100mg QD at home, states it easily goes up to 200s in the past but normally is in 130s systolic. US vaginal showed intramural uterine fibroid (3.6 x 2.8 x 2.7 cm), complex septated cyst of R ovary (1.7 x 1.8 x 1.5cm), and possible adenomyosis. CTH negative for ICH. EKG normal. Trops 20->18. Cr 2.07->1.75 (pt states last Cr outpatient was 1.6). Pt s/p labetalol 10mg IV x2, clonidine 0.5mg PO x1, and started on nifedipine gtt. Also received morphine 2mg IV x1 and tylenol 650mg x1 for her vaginal pain, which she endorses is now improved. Notes that headache has also improved to 1/10 but intermittently goes up to 5/10.     MICU consulted for uncontrolled HTN. Pt was seen and examined at bedside. Pt endorsed mild headache and mild vaginal pain, but controlled by pain meds and feeling improved overall. States she is still bleeding vaginally, but not as much and notes that it intermittently gets better/worse. Denied f/c/n/v, CP, SOB, abdominal pain, hematochezia, melena, changes in vision, dizziness. BP check at the time was 154/89 (~30 mins after receiving clonidine).      PAST MEDICAL & SURGICAL HISTORY:  Hypertension  Hx of Fibroids    FAMILY HISTORY:  DM in father  HTN and CVA in grandmother  Renal failure in sister    SOCIAL HISTORY:  Smoking: Denies  EtOH Use: Denies    Allergies    No Known Allergies    Intolerances        HOME MEDICATIONS:  clonadine 0.5 BID  norvastatin 100 mg QD    REVIEW OF SYSTEMS:    CONSTITUTIONAL: +headache, improved; No weakness, fevers or chills  EYES/ENT: had blurry vision before but resolved; No visual changes;  No vertigo or throat pain   NECK: No pain or stiffness  RESPIRATORY: No cough, wheezing, hemoptysis; No shortness of breath  CARDIOVASCULAR: No chest pain or palpitations  GASTROINTESTINAL: No abdominal or epigastric pain. No nausea, vomiting, or hematemesis; No diarrhea or constipation. No melena or hematochezia.  GENITOURINARY: +vaginal pain, improved; No dysuria, frequency or hematuria  NEUROLOGICAL: No numbness or weakness  SKIN: No itching, burning, rashes, or lesions   All other review of systems is negative unless indicated above.    OBJECTIVE:  ICU Vital Signs Last 24 Hrs  T(C): 37 (2020 21:10), Max: 37 (2020 20:09)  T(F): 98.6 (2020 21:10), Max: 98.6 (2020 20:09)  HR: 76 (2020 02:31) (62 - 81)  BP: 154/86 (2020 02:31) (154/86 - 270/123)  BP(mean): 135 (2020 01:30) (114 - 188)  ABP: --  ABP(mean): --  RR: 18 (2020 02:31) (12 - 18)  SpO2: 100% (2020 02:31) (100% - 100%)        CAPILLARY BLOOD GLUCOSE          PHYSICAL EXAM:  GENERAL: NAD, well-developed, obese woman laying comfortably in bed, appearing slightly anxious  HEENT: PERRL, conjunctiva and sclera clear  CHEST/LUNG: Clear to auscultation bilaterally; No wheezes or rales  HEART: Regular rate and rhythm; No murmurs, rubs, or gallops  ABDOMEN: Soft, Nontender, Nondistended; Bowel sounds present  EXTREMITIES:  2+ Peripheral Pulses, No clubbing, cyanosis, or edema  PSYCH: AAOx3  NEUROLOGY: non-focal  SKIN: No rashes or lesions    HOSPITAL MEDICATIONS:  MEDICATIONS  (STANDING):  niCARdipine Infusion 5 mG/Hr (25 mL/Hr) IV Continuous <Continuous>    MEDICATIONS  (PRN):      LABS:                        10.7   7.08  )-----------( 269      ( 2020 23:35 )             34.1         140  |  111<H>  |  28<H>  ----------------------------<  92  4.3   |  15<L>  |  1.75<H>    Ca    9.1      2020 23:35    TPro  7.3  /  Alb  3.9  /  TBili  < 0.2<L>  /  DBili  x   /  AST  13  /  ALT  6   /  AlkPhos  73      PT/INR - ( 2020 20:42 )   PT: 11.8 SEC;   INR: 1.03          PTT - ( 2020 20:42 )  PTT:30.8 SEC  Urinalysis Basic - ( 2020 21:00 )    Color: COLORLESS / Appearance: CLEAR / S.016 / pH: 6.0  Gluc: NEGATIVE / Ketone: NEGATIVE  / Bili: NEGATIVE / Urobili: NORMAL   Blood: MODERATE / Protein: 200 / Nitrite: NEGATIVE   Leuk Esterase: NEGATIVE / RBC: >50 / WBC 3-5   Sq Epi: OCC / Non Sq Epi: x / Bacteria: NEGATIVE            MICROBIOLOGY:     RADIOLOGY:  < from: US Transvaginal (20 @ 21:37) >  IMPRESSION:  1. There is a 3.6 x 2.8 x 2.7 cm intramural uterine fibroid.  2. There is also a 1.7 x 1.8 x 1.5 cm complex septated cyst of the right ovary. Follow-up ultrasound can be obtained in 2 cycles to document resolution/stability.  3. Correlate with possible adenomyosis.  4. Left ovary was not visualized on this exam.    < end of copied text >      EKG: NSR, QTc 422

## 2020-06-29 NOTE — PROGRESS NOTE ADULT - SUBJECTIVE AND OBJECTIVE BOX
THANIA STUBBS  49y Female  MRN:[id number]    Patient is a 49y old  Female who presents with a chief complaint of HTN Emergency, Vaginal Bleeding (2020 12:23)    HPI:  This is a 50yo F w/ PMHX of HTN and fibroids who presented to ED with complaints of vaginal bleeding, HA, blurry vision and dizziness. Pt reports she has had intermittent, moderately heavy vaginal bleeding for past month, hard to tell when her LMP was, but she thinks the 1st week of . Today, she developed heavy vaginal bleeding w/ large clots, soaking 12 pads in a matter of 6hrs. She was also having sharp abdominal and pelvic pain, worse than the pain she normally gets with mensuration. She denies having chest pain, palpitations, SOB, cough, sick contacts, fever, chills, N/V/D/C, dysuria, melena or hematochezia.     In ED, pt found to have BP up to 270/123, s/p labetalol 10mg IV x2, clonidine 0.5mg PO x1, and nifedipine gtt. SBP dropped to 90s. s/p 1L bolus. EKbpm, NSR, TWI in avl, qtc: 422.  Hb.3-->10.7. Trops 20->18. Cr 2.07->1.75 (pt states last Cr outpatient was 1.6). US vaginal showed intramural uterine fibroid (3.6 x 2.8 x 2.7 cm), complex septated cyst of R ovary (1.7 x 1.8 x 1.5cm), and possible adenomyosis. CTH negative. (2020 04:29)      Patient seen and evaluated at bedside. No acute events overnight except as noted.    Interval HPI: c/o vaginal bleeding     PAST MEDICAL & SURGICAL HISTORY:  Fibroids  Hypertension  S/P       REVIEW OF SYSTEMS:  as per hpi     VITALS:  Vital Signs Last 24 Hrs  T(C): 36.6 (2020 16:03), Max: 37 (2020 20:09)  T(F): 97.8 (2020 16:03), Max: 98.6 (2020 20:09)  HR: 67 (2020 16:03) (56 - 81)  BP: 127/79 (2020 16:03) (100/60 - 270/123)  BP(mean): 135 (2020 01:30) (114 - 188)  RR: 18 (2020 16:03) (12 - 18)  SpO2: 100% (2020 16:03) (98% - 100%)  CAPILLARY BLOOD GLUCOSE        I&O's Summary      PHYSICAL EXAM:  GENERAL: NAD, well-developed  HEAD:  Atraumatic, Normocephalic  EYES: EOMI, PERRLA, conjunctiva and sclera clear  NECK: Supple, No JVD  CHEST/LUNG: Clear to auscultation bilaterally; No wheeze  HEART: S1, S2; No murmurs, rubs, or gallops  ABDOMEN: Soft, Nontender, Nondistended; Bowel sounds present  EXTREMITIES:  2+ Peripheral Pulses, No clubbing, cyanosis, or edema  PSYCH: Normal affect  NEUROLOGY: AAOX3; non-focal  SKIN: No rashes or lesions    Consultant(s) Notes Reviewed:  [x ] YES  [ ] NO  Care Discussed with Consultants/Other Providers [ x] YES  [ ] NO    MEDS:  MEDICATIONS  (STANDING):  cloNIDine 0.1 milliGRAM(s) Oral two times a day  losartan 50 milliGRAM(s) Oral daily  sodium chloride 0.9% lock flush 3 milliLiter(s) IV Push every 8 hours    MEDICATIONS  (PRN):  cloNIDine 0.1 milliGRAM(s) Oral every 4 hours PRN SBP>180 and/or DBP>110    ALLERGIES:  No Known Allergies      LABS:                        10.0   6.86  )-----------( 248      ( 2020 06:47 )             32.1     06-29    140  |  110<H>  |  27<H>  ----------------------------<  119<H>  4.7   |  19<L>  |  1.89<H>    Ca    9.0      2020 06:47    TPro  6.5  /  Alb  3.6  /  TBili  < 0.2<L>  /  DBili  x   /  AST  11  /  ALT  8   /  AlkPhos  57  06-29    PT/INR - ( 2020 20:42 )   PT: 11.8 SEC;   INR: 1.03          PTT - ( 2020 20:42 )  PTT:30.8 SEC      LIVER FUNCTIONS - ( 2020 06:47 )  Alb: 3.6 g/dL / Pro: 6.5 g/dL / ALK PHOS: 57 u/L / ALT: 8 u/L / AST: 11 u/L / GGT: x           Urinalysis Basic - ( 2020 21:00 )    Color: COLORLESS / Appearance: CLEAR / S.016 / pH: 6.0  Gluc: NEGATIVE / Ketone: NEGATIVE  / Bili: NEGATIVE / Urobili: NORMAL   Blood: MODERATE / Protein: 200 / Nitrite: NEGATIVE   Leuk Esterase: NEGATIVE / RBC: >50 / WBC 3-5   Sq Epi: OCC / Non Sq Epi: x / Bacteria: NEGATIVE      TSH: Thyroid Stimulating Hormone, Serum: 1.39 uIU/mL ( @ 06:47)    A1c:  BNP:  Lipid panel:   cultures:     RADIOLOGY & ADDITIONAL TESTS:    Imaging Personally Reviewed:  [ ] YES  [ ] NO

## 2020-06-30 LAB
ANION GAP SERPL CALC-SCNC: 12 MMO/L — SIGNIFICANT CHANGE UP (ref 7–14)
BUN SERPL-MCNC: 35 MG/DL — HIGH (ref 7–23)
C3 SERPL-MCNC: 131.7 MG/DL — SIGNIFICANT CHANGE UP (ref 90–180)
C4 SERPL-MCNC: 26.8 MG/DL — SIGNIFICANT CHANGE UP (ref 10–40)
CALCIUM SERPL-MCNC: 9.4 MG/DL — SIGNIFICANT CHANGE UP (ref 8.4–10.5)
CHLORIDE SERPL-SCNC: 109 MMOL/L — HIGH (ref 98–107)
CO2 SERPL-SCNC: 20 MMOL/L — LOW (ref 22–31)
CREAT SERPL-MCNC: 2.1 MG/DL — HIGH (ref 0.5–1.3)
GLUCOSE SERPL-MCNC: 111 MG/DL — HIGH (ref 70–99)
HCT VFR BLD CALC: 31.7 % — LOW (ref 34.5–45)
HGB BLD-MCNC: 10.1 G/DL — LOW (ref 11.5–15.5)
MCHC RBC-ENTMCNC: 28.4 PG — SIGNIFICANT CHANGE UP (ref 27–34)
MCHC RBC-ENTMCNC: 31.9 % — LOW (ref 32–36)
MCV RBC AUTO: 89 FL — SIGNIFICANT CHANGE UP (ref 80–100)
NRBC # FLD: 0 K/UL — SIGNIFICANT CHANGE UP (ref 0–0)
PLATELET # BLD AUTO: 239 K/UL — SIGNIFICANT CHANGE UP (ref 150–400)
PMV BLD: 10.6 FL — SIGNIFICANT CHANGE UP (ref 7–13)
POTASSIUM SERPL-MCNC: 4.2 MMOL/L — SIGNIFICANT CHANGE UP (ref 3.5–5.3)
POTASSIUM SERPL-SCNC: 4.2 MMOL/L — SIGNIFICANT CHANGE UP (ref 3.5–5.3)
RBC # BLD: 3.56 M/UL — LOW (ref 3.8–5.2)
RBC # FLD: 21.3 % — HIGH (ref 10.3–14.5)
SODIUM SERPL-SCNC: 141 MMOL/L — SIGNIFICANT CHANGE UP (ref 135–145)
WBC # BLD: 6.55 K/UL — SIGNIFICANT CHANGE UP (ref 3.8–10.5)
WBC # FLD AUTO: 6.55 K/UL — SIGNIFICANT CHANGE UP (ref 3.8–10.5)

## 2020-06-30 PROCEDURE — 93306 TTE W/DOPPLER COMPLETE: CPT | Mod: 26

## 2020-06-30 RX ORDER — LOSARTAN POTASSIUM 100 MG/1
100 TABLET, FILM COATED ORAL DAILY
Refills: 0 | Status: DISCONTINUED | OUTPATIENT
Start: 2020-06-30 | End: 2020-07-03

## 2020-06-30 RX ORDER — NIFEDIPINE 30 MG
1 TABLET, EXTENDED RELEASE 24 HR ORAL
Qty: 0 | Refills: 0 | DISCHARGE

## 2020-06-30 RX ADMIN — SODIUM CHLORIDE 3 MILLILITER(S): 9 INJECTION INTRAMUSCULAR; INTRAVENOUS; SUBCUTANEOUS at 02:37

## 2020-06-30 RX ADMIN — SODIUM CHLORIDE 3 MILLILITER(S): 9 INJECTION INTRAMUSCULAR; INTRAVENOUS; SUBCUTANEOUS at 21:07

## 2020-06-30 RX ADMIN — Medication 0.1 MILLIGRAM(S): at 06:20

## 2020-06-30 RX ADMIN — SODIUM CHLORIDE 3 MILLILITER(S): 9 INJECTION INTRAMUSCULAR; INTRAVENOUS; SUBCUTANEOUS at 13:03

## 2020-06-30 RX ADMIN — LOSARTAN POTASSIUM 100 MILLIGRAM(S): 100 TABLET, FILM COATED ORAL at 16:11

## 2020-06-30 RX ADMIN — Medication 0.1 MILLIGRAM(S): at 21:07

## 2020-06-30 RX ADMIN — Medication 0.1 MILLIGRAM(S): at 13:03

## 2020-06-30 NOTE — PROGRESS NOTE ADULT - SUBJECTIVE AND OBJECTIVE BOX
NEPHROLOGY     Patient seen and examined.    MEDICATIONS  (STANDING):  cloNIDine 0.1 milliGRAM(s) Oral two times a day  losartan 50 milliGRAM(s) Oral daily  sodium chloride 0.9% lock flush 3 milliLiter(s) IV Push every 8 hours    VITALS:  T(C): , Max: 36.6 (20 @ 13:44)  T(F): , Max: 97.8 (20 @ 13:44)  HR: 60 (20 @ 08:00)  BP: 145/99 (20 @ 08:00)  RR: 17 (20 @ 08:00)  SpO2: 98% (20 @ 08:00)    PHYSICAL EXAM:  Constitutional: NAD, Alert  HEENT: NCAT, MMM  Neck: Supple, No JVD  Respiratory: CTA-b/l  Cardiovascular: RRR s1s2, no m/r/g  Gastrointestinal: BS+, soft, NT/ND  Extremities: No peripheral edema b/l  Neurological: no focal deficits; strength grossly intact  Back: no CVAT b/l  Skin: No rashes, no nevi    LABS:                        10.1   6.55  )-----------( 239      ( 2020 06:29 )             31.7     06-30    141  |  109<H>  |  35<H>  ----------------------------<  111<H>  4.2   |  20<L>  |  2.10<H>    Ca    9.4      2020 06:29    TPro  6.5  /  Alb  3.6  /  TBili  < 0.2<L>  /  DBili  x   /  AST  11  /  ALT  8   /  AlkPhos  57  -29    Urine Studies:  Urinalysis Basic - ( 2020 21:00 )    Color: COLORLESS / Appearance: CLEAR / S.016 / pH: 6.0  Gluc: NEGATIVE / Ketone: NEGATIVE  / Bili: NEGATIVE / Urobili: NORMAL   Blood: MODERATE / Protein: 200 / Nitrite: NEGATIVE   Leuk Esterase: NEGATIVE / RBC: >50 / WBC 3-5   Sq Epi: OCC / Non Sq Epi: x / Bacteria: NEGATIVE NEPHROLOGY         Patient seen and examined laying in bed. Pt reports feeling fine, though concerned about BP, noted to be elevated (184/96) this AM.    MEDICATIONS  (STANDING):  cloNIDine 0.1 milliGRAM(s) Oral two times a day  losartan 50 milliGRAM(s) Oral daily  sodium chloride 0.9% lock flush 3 milliLiter(s) IV Push every 8 hours    VITALS:  T(C): , Max: 36.6 (20 @ 13:44)  T(F): , Max: 97.8 (20 @ 13:44)  HR: 60 (20 @ 08:00)  BP: 145/99 (20 @ 08:00)  RR: 17 (20 @ 08:00)  SpO2: 98% (20 @ 08:00)    PHYSICAL EXAM:  Constitutional: NAD, Alert  HEENT: NCAT, MMM  Neck: Supple, No JVD  Respiratory: CTA-b/l  Cardiovascular: RRR s1s2, no m/r/g  Gastrointestinal: BS+, soft, NT/ND  Extremities: No peripheral edema b/l  Neurological: no focal deficits; strength grossly intact  Back: no CVAT b/l  Skin: No rashes, no nevi    LABS:                        10.1   6.55  )-----------( 239      ( 2020 06:29 )             31.7     06-30    141  |  109<H>  |  35<H>  ----------------------------<  111<H>  4.2   |  20<L>  |  2.10<H>    Ca    9.4      2020 06:29    TPro  6.5  /  Alb  3.6  /  TBili  < 0.2<L>  /  DBili  x   /  AST  11  /  ALT  8   /  AlkPhos  57      Urine Studies:  Urinalysis Basic - ( 2020 21:00 )    Color: COLORLESS / Appearance: CLEAR / S.016 / pH: 6.0  Gluc: NEGATIVE / Ketone: NEGATIVE  / Bili: NEGATIVE / Urobili: NORMAL   Blood: MODERATE / Protein: 200 / Nitrite: NEGATIVE   Leuk Esterase: NEGATIVE / RBC: >50 / WBC 3-5   Sq Epi: OCC / Non Sq Epi: x / Bacteria: NEGATIVE

## 2020-06-30 NOTE — PROGRESS NOTE ADULT - PROBLEM SELECTOR PLAN 1
- Admit to telemetry    Seen by MICU, not a candidate.   - Goal SBP of 140-160 for next 24hrs   -  q4 Neuro checks  - q4 vitals, monitor BP closely  cards eval noted  bp improved  cont clonidine and losartan as ordered

## 2020-06-30 NOTE — PHARMACOTHERAPY INTERVENTION NOTE - COMMENTS
Medication history is complete. Medication list updated in Outpatient Medication Record ([facility]). Please call spectra [phone] if you have any questions.

## 2020-06-30 NOTE — PROGRESS NOTE ADULT - PROBLEM SELECTOR PLAN 4
- Likely elevated in setting of demand ischemia 2/2 to HTN Emergency  - Downtrending   - Pt chest pain free  f/u echo

## 2020-06-30 NOTE — PROGRESS NOTE ADULT - SUBJECTIVE AND OBJECTIVE BOX
THANIA STUBBS  49y Female  MRN:[id number]    Patient is a 49y old  Female who presents with a chief complaint of HTN Emergency, Vaginal Bleeding (2020 12:23)    HPI:  This is a 48yo F w/ PMHX of HTN and fibroids who presented to ED with complaints of vaginal bleeding, HA, blurry vision and dizziness. Pt reports she has had intermittent, moderately heavy vaginal bleeding for past month, hard to tell when her LMP was, but she thinks the 1st week of . Today, she developed heavy vaginal bleeding w/ large clots, soaking 12 pads in a matter of 6hrs. She was also having sharp abdominal and pelvic pain, worse than the pain she normally gets with mensuration. She denies having chest pain, palpitations, SOB, cough, sick contacts, fever, chills, N/V/D/C, dysuria, melena or hematochezia.     In ED, pt found to have BP up to 270/123, s/p labetalol 10mg IV x2, clonidine 0.5mg PO x1, and nifedipine gtt. SBP dropped to 90s. s/p 1L bolus. EKbpm, NSR, TWI in avl, qtc: 422.  Hb.3-->10.7. Trops 20->18. Cr 2.07->1.75 (pt states last Cr outpatient was 1.6). US vaginal showed intramural uterine fibroid (3.6 x 2.8 x 2.7 cm), complex septated cyst of R ovary (1.7 x 1.8 x 1.5cm), and possible adenomyosis. CTH negative. (2020 04:29)      Patient seen and evaluated at bedside. No acute events overnight except as noted.    Interval HPI: o/n events noted     PAST MEDICAL & SURGICAL HISTORY:  Fibroids  Hypertension  S/P       REVIEW OF SYSTEMS:  as per hpi     VITALS:  Vital Signs Last 24 Hrs  T(C): 36.7 (2020 16:00), Max: 36.7 (2020 16:00)  T(F): 98 (2020 16:00), Max: 98 (2020 16:00)  HR: 64 (2020 16:00) (59 - 70)  BP: 202/115 (2020 16:00) (135/65 - 202/115)  BP(mean): --  RR: 17 (2020 16:00) (17 - 18)  SpO2: 99% (2020 16:00) (98% - 100%)      PHYSICAL EXAM:  GENERAL: NAD, well-developed  HEAD:  Atraumatic, Normocephalic  EYES: EOMI, PERRLA, conjunctiva and sclera clear  NECK: Supple, No JVD  CHEST/LUNG: Clear to auscultation bilaterally; No wheeze  HEART: S1, S2; No murmurs, rubs, or gallops  ABDOMEN: Soft, Nontender, Nondistended; Bowel sounds present  EXTREMITIES:  2+ Peripheral Pulses, No clubbing, cyanosis, or edema  PSYCH: Normal affect  NEUROLOGY: AAOX3; non-focal  SKIN: No rashes or lesions    Consultant(s) Notes Reviewed:  [x ] YES  [ ] NO  Care Discussed with Consultants/Other Providers [ x] YES  [ ] NO    MEDS:  MEDICATIONS  (STANDING):  cloNIDine 0.1 milliGRAM(s) Oral three times a day  losartan 100 milliGRAM(s) Oral daily  sodium chloride 0.9% lock flush 3 milliLiter(s) IV Push every 8 hours    MEDICATIONS  (PRN):      ALLERGIES:  No Known Allergies      LABS:                                                  10.1   6.55  )-----------( 239      ( 2020 06:29 )             31.7   06-30    141  |  109<H>  |  35<H>  ----------------------------<  111<H>  4.2   |  20<L>  |  2.10<H>    Ca    9.4      2020 06:29    TPro  6.5  /  Alb  3.6  /  TBili  < 0.2<L>  /  DBili  x   /  AST  11  /  ALT  8   /  AlkPhos  57

## 2020-06-30 NOTE — PROGRESS NOTE ADULT - ASSESSMENT
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 Hypertensive nephrosclerosis? Chronic GN? She had proteinuria and blood on her urinalysis, but the urinalysis almost certainly was impacted by her vaginal bleeding. Regardless, even if she has a chronic glomerulonephritis, I doubt that immunosuppression would be of much benefit at this point...the underlying process impacting her kidneys has been around for years....and creatinine appears to be relatively stable since 2018. Risk>benefit of renal biopsy.    (2)Metabolic acidosis - mild - renally mediated    (3)HTN - hypertensive emergency on admission (presumed papilledema) - presently on montherapy with Clonidine 0.1 bid. Taking Irbersartan at home; given that her renal function is stable, we can add back ARB at this point. 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 Hypertensive nephrosclerosis? Chronic GN? She had proteinuria and blood on her urinalysis, but the urinalysis almost certainly was impacted by her vaginal bleeding. Regardless, even if she has a chronic glomerulonephritis, I doubt that immunosuppression would be of much benefit at this point...the underlying process impacting her kidneys has been around for years....and creatinine appears to be relatively stable since 2018. Risk>benefit of renal biopsy.    (2)Metabolic acidosis - mild - renally mediated    (3)HTN - hypertensive emergency on admission (presumed papilledema) - presently on monotherapy with Clonidine 0.1 bid. Taking Irbersartan at home; given that her renal function is stable, we can add back ARB at this point.     RECOMMEND:  (1)Increase Cozaar to 100mg po daily  (2)Clonidine as ordered   (3)If systolic BP trends up above 160 as of tomorrow, consider adding HCTZ 25mg po qd at that point  (4)No urine studies for now (given that they would likely be impacted by the vaginal bleeding)  (5)Meds for GFR 30ml/min (present dosing is acceptable)  (6)ANCA, JESSICA, Anti-GBM, C3, C4    D/w Dr. Patrick Fay, NP-C  [facility]   [phone]

## 2020-06-30 NOTE — PROGRESS NOTE ADULT - SUBJECTIVE AND OBJECTIVE BOX
Subjective: Patient seen and examined. No new events except as noted.     REVIEW OF SYSTEMS:    CONSTITUTIONAL:+ weakness, fevers or chills  EYES/ENT: No visual changes;  No vertigo or throat pain   NECK: No pain or stiffness  RESPIRATORY: No cough, wheezing, hemoptysis; No shortness of breath  CARDIOVASCULAR: No chest pain or palpitations  GASTROINTESTINAL: No abdominal or epigastric pain. No nausea, vomiting, or hematemesis; No diarrhea or constipation. No melena or hematochezia.  GENITOURINARY: No dysuria, frequency or hematuria  NEUROLOGICAL: No numbness or weakness  SKIN: No itching, burning, rashes, or lesions   All other review of systems is negative unless indicated above.    MEDICATIONS:  MEDICATIONS  (STANDING):  cloNIDine 0.1 milliGRAM(s) Oral two times a day  losartan 50 milliGRAM(s) Oral daily  sodium chloride 0.9% lock flush 3 milliLiter(s) IV Push every 8 hours      PHYSICAL EXAM:  T(C): 36.6 (06-30-20 @ 08:00), Max: 36.6 (06-29-20 @ 13:44)  HR: 60 (06-30-20 @ 08:00) (59 - 67)  BP: 145/99 (06-30-20 @ 08:00) (127/79 - 184/96)  RR: 17 (06-30-20 @ 08:00) (17 - 18)  SpO2: 98% (06-30-20 @ 08:00) (98% - 100%)  Wt(kg): --  I&O's Summary        Appearance: Normal	  HEENT:   Normal oral mucosa, PERRL, EOMI	  Lymphatic: No lymphadenopathy , no edema  Cardiovascular: Normal S1 S2, No JVD, No murmurs , Peripheral pulses palpable 2+ bilaterally  Respiratory: Lungs clear to auscultation, normal effort 	  Gastrointestinal:  Soft, Non-tender, + BS	  Skin: No rashes, No ecchymoses, No cyanosis, warm to touch  Musculoskeletal: Normal range of motion, normal strength  Psychiatry:  Mood & affect appropriate  Ext: No edema      LABS:    CARDIAC MARKERS:                                10.1   6.55  )-----------( 239      ( 30 Jun 2020 06:29 )             31.7     06-30    141  |  109<H>  |  35<H>  ----------------------------<  111<H>  4.2   |  20<L>  |  2.10<H>    Ca    9.4      30 Jun 2020 06:29    TPro  6.5  /  Alb  3.6  /  TBili  < 0.2<L>  /  DBili  x   /  AST  11  /  ALT  8   /  AlkPhos  57  06-29    proBNP:   Lipid Profile:   HgA1c:   TSH:     NEGATIVE          TELEMETRY: 	    ECG:  	  RADIOLOGY:   DIAGNOSTIC TESTING:  [ ] Echocardiogram:  [ ]  Catheterization:  [ ] Stress Test:    OTHER:

## 2020-06-30 NOTE — PROGRESS NOTE ADULT - ASSESSMENT
50 yo F w/ hx of HTN and fibroids, presenting with intermittent heavy vaginal bleeding for 1 month and acutely worsening today (using 12 pads over 6 hours and seeing blood clots). Pt reports the volume of blood loss was so much that the toilet flushed by itself. At the time, she reports experiencing lightheadedness, headache, and blurry vision. Pt notes that her periods are typically regular with moderate flow lasting 4 days, but this past month the bleeding started becoming irregular and intermittently heavy. She notes having been told she has fibroids in the past but was told it was the size of a sweet pea at the time. Endorses that she normally feels vaginal pain when she is menstruating, but pain was worse today.     In ED, pt found to have BP up to 270/123. She takes clonidine 0.5mg BID and norvastatin 100mg QD at home, states it easily goes up to 200s in the past but normally is in 130s systolic. US vaginal showed intramural uterine fibroid (3.6 x 2.8 x 2.7 cm), complex septated cyst of R ovary (1.7 x 1.8 x 1.5cm), and possible adenomyosis. CTH negative for ICH. EKG normal. Trops 20->18. Cr 2.07->1.75 (pt states last Cr outpatient was 1.6). Pt s/p labetalol 10mg IV x2, clonidine 0.5mg PO x1, and started on nifedipine gtt. Also received morphine 2mg IV x1 and tylenol 650mg x1 for her vaginal pain, which she endorses is now improved. Notes that headache has also improved to 1/10 but intermittently goes up to 5/10.

## 2020-07-01 DIAGNOSIS — N18.4 CHRONIC KIDNEY DISEASE, STAGE 4 (SEVERE): ICD-10-CM

## 2020-07-01 LAB
ANION GAP SERPL CALC-SCNC: 13 MMO/L — SIGNIFICANT CHANGE UP (ref 7–14)
BUN SERPL-MCNC: 33 MG/DL — HIGH (ref 7–23)
C-ANCA SER-ACNC: NEGATIVE — SIGNIFICANT CHANGE UP
CALCIUM SERPL-MCNC: 9.5 MG/DL — SIGNIFICANT CHANGE UP (ref 8.4–10.5)
CHLORIDE SERPL-SCNC: 106 MMOL/L — SIGNIFICANT CHANGE UP (ref 98–107)
CO2 SERPL-SCNC: 18 MMOL/L — LOW (ref 22–31)
CREAT SERPL-MCNC: 2 MG/DL — HIGH (ref 0.5–1.3)
GLUCOSE SERPL-MCNC: 88 MG/DL — SIGNIFICANT CHANGE UP (ref 70–99)
HCT VFR BLD CALC: 34 % — LOW (ref 34.5–45)
HGB BLD-MCNC: 10.6 G/DL — LOW (ref 11.5–15.5)
MAGNESIUM SERPL-MCNC: 2.2 MG/DL — SIGNIFICANT CHANGE UP (ref 1.6–2.6)
MCHC RBC-ENTMCNC: 27.3 PG — SIGNIFICANT CHANGE UP (ref 27–34)
MCHC RBC-ENTMCNC: 31.2 % — LOW (ref 32–36)
MCV RBC AUTO: 87.6 FL — SIGNIFICANT CHANGE UP (ref 80–100)
NRBC # FLD: 0 K/UL — SIGNIFICANT CHANGE UP (ref 0–0)
P-ANCA SER-ACNC: NEGATIVE — SIGNIFICANT CHANGE UP
PHOSPHATE SERPL-MCNC: 2.3 MG/DL — LOW (ref 2.5–4.5)
PLATELET # BLD AUTO: 254 K/UL — SIGNIFICANT CHANGE UP (ref 150–400)
PMV BLD: 10.2 FL — SIGNIFICANT CHANGE UP (ref 7–13)
POTASSIUM SERPL-MCNC: 4.1 MMOL/L — SIGNIFICANT CHANGE UP (ref 3.5–5.3)
POTASSIUM SERPL-SCNC: 4.1 MMOL/L — SIGNIFICANT CHANGE UP (ref 3.5–5.3)
RBC # BLD: 3.88 M/UL — SIGNIFICANT CHANGE UP (ref 3.8–5.2)
RBC # FLD: 20.8 % — HIGH (ref 10.3–14.5)
SODIUM SERPL-SCNC: 137 MMOL/L — SIGNIFICANT CHANGE UP (ref 135–145)
WBC # BLD: 6.78 K/UL — SIGNIFICANT CHANGE UP (ref 3.8–10.5)
WBC # FLD AUTO: 6.78 K/UL — SIGNIFICANT CHANGE UP (ref 3.8–10.5)

## 2020-07-01 RX ORDER — HYDRALAZINE HCL 50 MG
50 TABLET ORAL EVERY 8 HOURS
Refills: 0 | Status: DISCONTINUED | OUTPATIENT
Start: 2020-07-01 | End: 2020-07-03

## 2020-07-01 RX ORDER — HYDRALAZINE HCL 50 MG
5 TABLET ORAL ONCE
Refills: 0 | Status: COMPLETED | OUTPATIENT
Start: 2020-07-01 | End: 2020-07-01

## 2020-07-01 RX ORDER — HYDROCHLOROTHIAZIDE 25 MG
25 TABLET ORAL DAILY
Refills: 0 | Status: DISCONTINUED | OUTPATIENT
Start: 2020-07-01 | End: 2020-07-01

## 2020-07-01 RX ADMIN — SODIUM CHLORIDE 3 MILLILITER(S): 9 INJECTION INTRAMUSCULAR; INTRAVENOUS; SUBCUTANEOUS at 13:04

## 2020-07-01 RX ADMIN — Medication 25 MILLIGRAM(S): at 09:23

## 2020-07-01 RX ADMIN — Medication 5 MILLIGRAM(S): at 00:33

## 2020-07-01 RX ADMIN — SODIUM CHLORIDE 3 MILLILITER(S): 9 INJECTION INTRAMUSCULAR; INTRAVENOUS; SUBCUTANEOUS at 05:00

## 2020-07-01 RX ADMIN — LOSARTAN POTASSIUM 100 MILLIGRAM(S): 100 TABLET, FILM COATED ORAL at 16:20

## 2020-07-01 RX ADMIN — Medication 0.1 MILLIGRAM(S): at 11:47

## 2020-07-01 RX ADMIN — Medication 50 MILLIGRAM(S): at 13:03

## 2020-07-01 RX ADMIN — Medication 0.1 MILLIGRAM(S): at 05:00

## 2020-07-01 RX ADMIN — Medication 50 MILLIGRAM(S): at 21:54

## 2020-07-01 RX ADMIN — SODIUM CHLORIDE 3 MILLILITER(S): 9 INJECTION INTRAMUSCULAR; INTRAVENOUS; SUBCUTANEOUS at 21:54

## 2020-07-01 RX ADMIN — Medication 0.1 MILLIGRAM(S): at 21:54

## 2020-07-01 NOTE — CHART NOTE - NSCHARTNOTEFT_GEN_A_CORE
BP still trending up  overnight - as outlined in nephrology note will start hydrochlorothiazide 25 daily

## 2020-07-01 NOTE — PROVIDER CONTACT NOTE (OTHER) - ASSESSMENT
A&O x4. Breathing non-labored on room air. Denies chest pain, SOB, or dizziness. Pt denies blurry vision at this time. Blood pressure noted to be 126/90 at this time.

## 2020-07-01 NOTE — DISCHARGE NOTE PROVIDER - CARE PROVIDER_API CALL
Matt Hollingsworth  INTERNAL MEDICINE  [address] [place] [address]  Phone: [phone]  Fax: [phone]  Established Patient  Follow Up Time: 1 week

## 2020-07-01 NOTE — DISCHARGE NOTE PROVIDER - NSDCMRMEDTOKEN_GEN_ALL_CORE_FT
cloNIDine 0.1 mg oral tablet: 1 tab(s) orally 2 times a day  irbesartan 300 mg oral tablet: 1 tab(s) orally once a day cloNIDine 0.2 mg oral tablet: 1 tab(s) orally 3 times a day.Hold for Systolic BP less than 100  hydrALAZINE 50 mg oral tablet: 1 tab(s) orally every 8 hours. Hold for Systolic BP less than 100.  losartan 100 mg oral tablet: 1 tab(s) orally once a day. Hold for systolic BP &lt; 100.

## 2020-07-01 NOTE — PROVIDER CONTACT NOTE (OTHER) - ACTION/TREATMENT ORDERED:
Pt assessed at bedside by Tele PA Kae Malone. Pt refusing Tylenol and fluid bolus at this time. Pt agreed to drink 2 glasses of water at this time. Ice pack given for headache.

## 2020-07-01 NOTE — PROGRESS NOTE ADULT - SUBJECTIVE AND OBJECTIVE BOX
Nephrology Followup Note - [phone] - Dr Ruano / Dr Donis / Dr Arias / Dr Mazariegos / Dr Box / Dr Gibson / Dr Hollingsworth / Dr Avila  Pt seen and examined at bedside  No acute events overnight. No complaints.     Allergies:  No Known Allergies    Hospital Medications:   MEDICATIONS  (STANDING):  cloNIDine 0.1 milliGRAM(s) Oral three times a day  hydrALAZINE 50 milliGRAM(s) Oral every 8 hours  losartan 100 milliGRAM(s) Oral daily  sodium chloride 0.9% lock flush 3 milliLiter(s) IV Push every 8 hours      VITALS:  T(F): 98.3 (20 @ 11:30), Max: 98.3 (20 @ 11:30)  HR: 69 (20 @ 11:30)  BP: 178/102 (20 @ 11:30)  RR: 17 (20 @ 11:30)  SpO2: 100% (20 @ 11:30)  Wt(kg): --      PHYSICAL EXAM:  Constitutional: NAD  HEENT: anicteric sclera, oropharynx clear, MMM  Neck: No JVD  Respiratory: CTAB, no wheezes, rales or rhonchi  Cardiovascular: S1, S2, RRR  Gastrointestinal: BS+, soft, NT/ND  Extremities: No cyanosis or clubbing. No peripheral edema  Neurological: A/O x 3, no focal deficits  Psychiatric: Normal mood, normal affect  : No CVA tenderness. No mane.   Skin: No rashes    LABS:      137  |  106  |  33<H>  ----------------------------<  88  4.1   |  18<L>  |  2.00<H>    Ca    9.5      2020 05:30  Phos  2.3       Mg     2.2           Creatinine Trend: 2.00 <--, 2.10 <--, 1.89 <--, 1.75 <--, 2.07 <--                        10.6   6.78  )-----------( 254      ( 2020 05:30 )             34.0     Urine Studies:  Urinalysis Basic - ( 2020 21:00 )    Color: COLORLESS / Appearance: CLEAR / S.016 / pH: 6.0  Gluc: NEGATIVE / Ketone: NEGATIVE  / Bili: NEGATIVE / Urobili: NORMAL   Blood: MODERATE / Protein: 200 / Nitrite: NEGATIVE   Leuk Esterase: NEGATIVE / RBC: >50 / WBC 3-5   Sq Epi: OCC / Non Sq Epi:  / Bacteria: NEGATIVE        RADIOLOGY & ADDITIONAL STUDIES:

## 2020-07-01 NOTE — PROGRESS NOTE ADULT - PROBLEM SELECTOR PLAN 1
BP still elevated.   Cont ARB and clonidine for now.   Can add hydralazine 50mg TID for tighter BP Control.   Ideally would like to titrate down clonidine and increase hydralazine as needed.

## 2020-07-01 NOTE — PROGRESS NOTE ADULT - ASSESSMENT
48 YO F with CKD III, HTN a/w HTN emergency and vaginal bleeding. Will take over care from [facility].

## 2020-07-01 NOTE — CHART NOTE - NSCHARTNOTEFT_GEN_A_CORE
called to see patient for blood pressure 126/70 and headache   patient now laying down upset over blood pressure readings. Manual pressure checked in both arms 158/100 and 162/98 headache improved . Patient concerned the hydralazine was a medication that she stopped taking in the past ( confirmed with renal she was on labetalol previously but was stopped by patient due to headaches)   denies any visual disturbances, weakness , difficulty with speech at this time . Feeling better

## 2020-07-01 NOTE — PROGRESS NOTE ADULT - PROBLEM SELECTOR PLAN 4
- Likely elevated in setting of demand ischemia 2/2 to HTN Emergency  - Downtrending   - Pt chest pain free   echo noted

## 2020-07-01 NOTE — DISCHARGE NOTE PROVIDER - NSDCCPCAREPLAN_GEN_ALL_CORE_FT
PRINCIPAL DISCHARGE DIAGNOSIS  Diagnosis: Hypertensive urgency  Assessment and Plan of Treatment:       SECONDARY DISCHARGE DIAGNOSES  Diagnosis: Vaginal bleeding  Assessment and Plan of Treatment: Vaginal bleeding PRINCIPAL DISCHARGE DIAGNOSIS  Diagnosis: Hypertensive urgency  Assessment and Plan of Treatment: While hospitalized your blood pressure was extremely high. You had a CT scan of your head which was negative for any bleeding, or stroke. We gave you medications orally and through your IV to decreased your blood pressure. You had an echocardiogram which revealed presereved heart function, and EF of 63%. You were seen by cardiologist, and nephrologist. You will need to monitor your blood pressure at home, twice daily morning, and evening. Keep track of these results and bring them with you when you see your physicians for follow up. If your blood pressure becomes elevated greater than 170/100. Call your physician Dr. Hollingsworth, and your primary care physician for further medical management. If you are unable to get intouch with them return to [facility] ED for further evaluation.  Call and schedule a follow up with your outpatient nephrologist Dr Hollingsworth on Tuesday July 7, 2020.   Continue blood pressure medication regimen as directed. Monitor for any visual changes, headaches or dizziness. If these symptoms occur return to the hospital for further evaluation Monitor blood pressure regularly.  Follow up with your PCP /your nephrologist/ for further management for high blood pressure.      SECONDARY DISCHARGE DIAGNOSES  Diagnosis: Vaginal bleeding  Assessment and Plan of Treatment: Your were evaluated by Gynecology team while you were here for the vaginal bleeding. You had an ultrasound which revealed intramural uterine fibroid, complex septated cyst of Right ovary. Your blood counts were monitored and remained stable. You need to call and schedule a  follow up with your outpatient Gynecologist for further ongoing medical management. PRINCIPAL DISCHARGE DIAGNOSIS  Diagnosis: Hypertensive urgency  Assessment and Plan of Treatment: While hospitalized your blood pressure was extremely high. You had a CT scan of your head which was negative for any bleeding, or stroke. We gave you medications orally and through your IV to decreased your blood pressure. You had an echocardiogram which revealed presereved heart function, and EF of 63%. You were seen by cardiologist, and nephrologist. You will need to monitor your blood pressure at home, twice daily morning, and evening. Keep track of these results and bring them with you when you see your physicians for follow up. If your blood pressure becomes elevated greater than 170/100. Call your physician Dr. Hollingsworth, and your primary care physician for further medical management. If you are unable to get intouch with them return to [facility] ED for further evaluation.  Call and schedule a follow up with your outpatient nephrologist Dr Hollingsworth on Tuesday July 7, 2020.   Your blood pressure medications was adjusted and your clonidine was increased, you were changed to losartan, and hydralazine. Continue blood pressure medication regimen as directed. Monitor for any visual changes, headaches or dizziness. If these symptoms occur return to the hospital for further evaluation Monitor blood pressure regularly.  Follow up with your PCP /your nephrologist/ for further management for high blood pressure.      SECONDARY DISCHARGE DIAGNOSES  Diagnosis: Vaginal bleeding  Assessment and Plan of Treatment: Your were evaluated by Gynecology team while you were here for the vaginal bleeding. You had an ultrasound which revealed intramural uterine fibroid, complex septated cyst of Right ovary. Your blood counts were monitored and remained stable. You need to call and schedule a  follow up with your outpatient Gynecologist for further ongoing medical management.

## 2020-07-01 NOTE — PROGRESS NOTE ADULT - SUBJECTIVE AND OBJECTIVE BOX
THANIA STUBBS  49y Female  MRN:[id number]    Patient is a 49y old  Female who presents with a chief complaint of HTN Emergency, Vaginal Bleeding (2020 12:23)    HPI:  This is a 48yo F w/ PMHX of HTN and fibroids who presented to ED with complaints of vaginal bleeding, HA, blurry vision and dizziness. Pt reports she has had intermittent, moderately heavy vaginal bleeding for past month, hard to tell when her LMP was, but she thinks the 1st week of . Today, she developed heavy vaginal bleeding w/ large clots, soaking 12 pads in a matter of 6hrs. She was also having sharp abdominal and pelvic pain, worse than the pain she normally gets with mensuration. She denies having chest pain, palpitations, SOB, cough, sick contacts, fever, chills, N/V/D/C, dysuria, melena or hematochezia.     In ED, pt found to have BP up to 270/123, s/p labetalol 10mg IV x2, clonidine 0.5mg PO x1, and nifedipine gtt. SBP dropped to 90s. s/p 1L bolus. EKbpm, NSR, TWI in avl, qtc: 422.  Hb.3-->10.7. Trops 20->18. Cr 2.07->1.75 (pt states last Cr outpatient was 1.6). US vaginal showed intramural uterine fibroid (3.6 x 2.8 x 2.7 cm), complex septated cyst of R ovary (1.7 x 1.8 x 1.5cm), and possible adenomyosis. CTH negative. (2020 04:29)      Patient seen and evaluated at bedside. No acute events overnight except as noted.    Interval HPI: o/n events noted     PAST MEDICAL & SURGICAL HISTORY:  Fibroids  Hypertension  S/P       REVIEW OF SYSTEMS:  as per hpi     VITALS:  Vital Signs Last 24 Hrs  T(C): 36.7 (2020 13:00), Max: 36.8 (2020 11:30)  T(F): 98 (2020 13:00), Max: 98.3 (2020 11:30)  HR: 70 (2020 13:00) (62 - 70)  BP: 144/86 (2020 13:00) (144/86 - 202/115)  BP(mean): --  RR: 17 (2020 13:00) (17 - 18)  SpO2: 100% (2020 13:00) (99% - 100%)      PHYSICAL EXAM:  GENERAL: NAD, well-developed  HEAD:  Atraumatic, Normocephalic  EYES: EOMI, PERRLA, conjunctiva and sclera clear  NECK: Supple, No JVD  CHEST/LUNG: Clear to auscultation bilaterally; No wheeze  HEART: S1, S2; No murmurs, rubs, or gallops  ABDOMEN: Soft, Nontender, Nondistended; Bowel sounds present  EXTREMITIES:  2+ Peripheral Pulses, No clubbing, cyanosis, or edema  PSYCH: Normal affect  NEUROLOGY: AAOX3; non-focal  SKIN: No rashes or lesions    Consultant(s) Notes Reviewed:  [x ] YES  [ ] NO  Care Discussed with Consultants/Other Providers [ x] YES  [ ] NO    MEDS:  MEDICATIONS  (STANDING):  cloNIDine 0.1 milliGRAM(s) Oral three times a day  hydrALAZINE 50 milliGRAM(s) Oral every 8 hours  losartan 100 milliGRAM(s) Oral daily  sodium chloride 0.9% lock flush 3 milliLiter(s) IV Push every 8 hours    MEDICATIONS  (PRN):        ALLERGIES:  No Known Allergies      LABS:                                                            10.6   6.78  )-----------( 254      ( 2020 05:30 )             34.0   07-01    137  |  106  |  33<H>  ----------------------------<  88  4.1   |  18<L>  |  2.00<H>    Ca    9.5      2020 05:30  Phos  2.3     07-01  Mg     2.2     07-01

## 2020-07-01 NOTE — PROGRESS NOTE ADULT - SUBJECTIVE AND OBJECTIVE BOX
Subjective: Patient seen and examined. No new events except as noted.     REVIEW OF SYSTEMS:    CONSTITUTIONAL: No weakness, fevers or chills  EYES/ENT: No visual changes;  No vertigo or throat pain   NECK: No pain or stiffness  RESPIRATORY: No cough, wheezing, hemoptysis; No shortness of breath  CARDIOVASCULAR: No chest pain or palpitations  GASTROINTESTINAL: No abdominal or epigastric pain. No nausea, vomiting, or hematemesis; No diarrhea or constipation. No melena or hematochezia.  GENITOURINARY: No dysuria, frequency or hematuria  NEUROLOGICAL: No numbness or weakness  SKIN: No itching, burning, rashes, or lesions   All other review of systems is negative unless indicated above.    MEDICATIONS:  MEDICATIONS  (STANDING):  cloNIDine 0.1 milliGRAM(s) Oral three times a day  hydrochlorothiazide 25 milliGRAM(s) Oral daily  losartan 100 milliGRAM(s) Oral daily  sodium chloride 0.9% lock flush 3 milliLiter(s) IV Push every 8 hours      PHYSICAL EXAM:  T(C): 36.7 (07-01-20 @ 08:40), Max: 36.7 (06-30-20 @ 16:00)  HR: 70 (07-01-20 @ 08:40) (62 - 70)  BP: 161/99 (07-01-20 @ 08:40) (149/109 - 202/115)  RR: 17 (07-01-20 @ 08:40) (17 - 18)  SpO2: 100% (07-01-20 @ 08:40) (99% - 100%)  Wt(kg): --  I&O's Summary        Appearance: Normal	  HEENT:   Normal oral mucosa, PERRL, EOMI	  Lymphatic: No lymphadenopathy , no edema  Cardiovascular: Normal S1 S2, No JVD, No murmurs , Peripheral pulses palpable 2+ bilaterally  Respiratory: Lungs clear to auscultation, normal effort 	  Gastrointestinal:  Soft, Non-tender, + BS	  Skin: No rashes, No ecchymoses, No cyanosis, warm to touch  Musculoskeletal: Normal range of motion, normal strength  Psychiatry:  Mood & affect appropriate  Ext: No edema      LABS:    CARDIAC MARKERS:                                10.6   6.78  )-----------( 254      ( 01 Jul 2020 05:30 )             34.0     07-01    137  |  106  |  33<H>  ----------------------------<  88  4.1   |  18<L>  |  2.00<H>    Ca    9.5      01 Jul 2020 05:30  Phos  2.3     07-01  Mg     2.2     07-01      proBNP:   Lipid Profile:   HgA1c:   TSH:             TELEMETRY: 	    ECG:  	  RADIOLOGY:   DIAGNOSTIC TESTING:  [ ] Echocardiogram:  < from: Transthoracic Echocardiogram (06.30.20 @ 14:43) >    Patient name: THANIA STUBBS  YOB: 1970   Age: 49 (F)   MR#: [id number]  Study Date: 6/30/2020  [facility]  Study quality: Technically Fair  Referring Physician: Antonina Nugent MD  Blood Pressure: 200/105 mmHg  Height: 163 cm  Weight: 81 kg  BSA: 1.9 m2  ------------------------------------------------------------------------  PROCEDURE: Transthoracic echocardiogram with 2-D, M-Mode  and complete spectral and color flow Doppler.  INDICATION: Abnormal electrocardiogram (ECG) (EKG)  (R94.31), Essential (primary) hypertension (I10)  ------------------------------------------------------------------------  DIMENSIONS:  Dimensions:     Normal Values:  LA:     3.4 cm    2.0 - 4.0 cm  Ao:     2.9 cm  2.0 - 3.8 cm  SEPTUM: 1.2 cm    0.6 - 1.2 cm  PWT:    1.2 cm    0.6 - 1.1 cm  LVIDd:  5.0 cm    3.0 - 5.6 cm  LVIDs:  3.3 cm    1.8 - 4.0 cm  Derived Variables:  LVMI: 125 g/m2  RWT: 0.48  Fractional short: 34 %  Ejection Fraction (Teicholtz): 63 %  ------------------------------------------------------------------------  OBSERVATIONS:  Mitral Valve: Normal mitral valve. Minimal mitral  regurgitation.  Aortic Root: Normal aortic root.  Aortic Valve: Normal trileaflet aortic valve.  Left Atrium:Normal left atrium.  LA volume index = 24  cc/m2.  Left Ventricle: Normal left ventricular systolic function.  No segmental wall motion abnormalities. Moderate concentric  left ventricular hypertrophy. Mild diastolic dysfunction  (Stage I).  Right Heart: Normal right atrium. The right ventricle is  not well visualized; grossly normal right ventricular  systolic function. Normal tricuspid valve.  Minimal  tricuspid regurgitation. Normal pulmonic valve.  Pericardium/PleuraNormal pericardium with nopericardial  effusion.  ------------------------------------------------------------------------  CONCLUSIONS:  1. Normal mitral valve. Minimal mitral regurgitation.  2. Moderate concentric left ventricular hypertrophy.  3. Normal left ventricular systolic function. No segmental  wall motion abnormalities.  4. Mild diastolic dysfunction (Stage I).  5. The right ventricle is not well visualized; grossly  normal right ventricular systolic function.  ------------------------------------------------------------------------  Confirmed on  6/30/2020 - 17:04:25 by Judson Esposito M.D.  ------------------------------------------------------------------------    < end of copied text >    [ ]  Catheterization:  [ ] Stress Test:    OTHER:

## 2020-07-01 NOTE — PROGRESS NOTE ADULT - PROBLEM SELECTOR PLAN 1
- Admit to telemetry    Seen by MICU, not a candidate.   - Goal SBP of 140-160 for next 24hrs   -  q4 Neuro checks  - q4 vitals, monitor BP closely  cards austynal noted  bp improved  meds as per cards/renal

## 2020-07-01 NOTE — PROGRESS NOTE ADULT - ASSESSMENT
This is a 48yo F w/ PMHX of HTN and fibroids who presented to ED with complaints of vaginal bleeding, HA, blurry vision and dizziness. Found have BP of 270/123, with ANNIE. Admitted to telemetry for HTN Emergency.

## 2020-07-01 NOTE — DISCHARGE NOTE PROVIDER - NSDCFUADDINST_GEN_ALL_CORE_FT
Call and schedule a follow up with Dr Hollingsworth next week in the office. Continue to monitor your blood pressure twice daily and take your medications as directed.  Call and schedule a follow up with your Gynecologist with in the next 2 weeks.

## 2020-07-02 LAB
ANA TITR SER: NEGATIVE — SIGNIFICANT CHANGE UP
ANION GAP SERPL CALC-SCNC: 12 MMO/L — SIGNIFICANT CHANGE UP (ref 7–14)
BUN SERPL-MCNC: 31 MG/DL — HIGH (ref 7–23)
CALCIUM SERPL-MCNC: 9.7 MG/DL — SIGNIFICANT CHANGE UP (ref 8.4–10.5)
CHLORIDE SERPL-SCNC: 106 MMOL/L — SIGNIFICANT CHANGE UP (ref 98–107)
CO2 SERPL-SCNC: 19 MMOL/L — LOW (ref 22–31)
CREAT SERPL-MCNC: 2.03 MG/DL — HIGH (ref 0.5–1.3)
GLUCOSE SERPL-MCNC: 86 MG/DL — SIGNIFICANT CHANGE UP (ref 70–99)
HCT VFR BLD CALC: 32.8 % — LOW (ref 34.5–45)
HGB BLD-MCNC: 10.7 G/DL — LOW (ref 11.5–15.5)
MAGNESIUM SERPL-MCNC: 2.3 MG/DL — SIGNIFICANT CHANGE UP (ref 1.6–2.6)
MCHC RBC-ENTMCNC: 29 PG — SIGNIFICANT CHANGE UP (ref 27–34)
MCHC RBC-ENTMCNC: 32.6 % — SIGNIFICANT CHANGE UP (ref 32–36)
MCV RBC AUTO: 88.9 FL — SIGNIFICANT CHANGE UP (ref 80–100)
NRBC # FLD: 0 K/UL — SIGNIFICANT CHANGE UP (ref 0–0)
PHOSPHATE SERPL-MCNC: 4 MG/DL — SIGNIFICANT CHANGE UP (ref 2.5–4.5)
PLATELET # BLD AUTO: 257 K/UL — SIGNIFICANT CHANGE UP (ref 150–400)
PMV BLD: 10.6 FL — SIGNIFICANT CHANGE UP (ref 7–13)
POTASSIUM SERPL-MCNC: 4.2 MMOL/L — SIGNIFICANT CHANGE UP (ref 3.5–5.3)
POTASSIUM SERPL-SCNC: 4.2 MMOL/L — SIGNIFICANT CHANGE UP (ref 3.5–5.3)
RBC # BLD: 3.69 M/UL — LOW (ref 3.8–5.2)
RBC # FLD: 20.5 % — HIGH (ref 10.3–14.5)
SODIUM SERPL-SCNC: 137 MMOL/L — SIGNIFICANT CHANGE UP (ref 135–145)
WBC # BLD: 7.09 K/UL — SIGNIFICANT CHANGE UP (ref 3.8–10.5)
WBC # FLD AUTO: 7.09 K/UL — SIGNIFICANT CHANGE UP (ref 3.8–10.5)

## 2020-07-02 RX ADMIN — Medication 0.2 MILLIGRAM(S): at 21:33

## 2020-07-02 RX ADMIN — Medication 0.1 MILLIGRAM(S): at 05:39

## 2020-07-02 RX ADMIN — SODIUM CHLORIDE 3 MILLILITER(S): 9 INJECTION INTRAMUSCULAR; INTRAVENOUS; SUBCUTANEOUS at 05:38

## 2020-07-02 RX ADMIN — Medication 50 MILLIGRAM(S): at 21:33

## 2020-07-02 RX ADMIN — Medication 50 MILLIGRAM(S): at 14:25

## 2020-07-02 RX ADMIN — LOSARTAN POTASSIUM 100 MILLIGRAM(S): 100 TABLET, FILM COATED ORAL at 11:30

## 2020-07-02 RX ADMIN — Medication 0.2 MILLIGRAM(S): at 14:25

## 2020-07-02 RX ADMIN — Medication 50 MILLIGRAM(S): at 05:39

## 2020-07-02 RX ADMIN — SODIUM CHLORIDE 3 MILLILITER(S): 9 INJECTION INTRAMUSCULAR; INTRAVENOUS; SUBCUTANEOUS at 21:33

## 2020-07-02 RX ADMIN — SODIUM CHLORIDE 3 MILLILITER(S): 9 INJECTION INTRAMUSCULAR; INTRAVENOUS; SUBCUTANEOUS at 14:00

## 2020-07-02 NOTE — PROGRESS NOTE ADULT - PROBLEM SELECTOR PLAN 6
- DVT ppx: SCDs in setting of vaginal bleeding

## 2020-07-02 NOTE — PROGRESS NOTE ADULT - PROBLEM SELECTOR PLAN 5
-Med rec pharmacist emailed, f/u med rec

## 2020-07-02 NOTE — PROGRESS NOTE ADULT - PROBLEM SELECTOR PLAN 1
BP still elevated, large fluctuations noted.   Cont ARB and clonidine for now.   COntinue hydralazine 50mg TID, can titrate up if need, for tighter BP Control.   Ideally would like to titrate down clonidine and increase hydralazine as needed.

## 2020-07-02 NOTE — PROGRESS NOTE ADULT - SUBJECTIVE AND OBJECTIVE BOX
THANIA STUBBS  49y Female  MRN:[id number]    Patient is a 49y old  Female who presents with a chief complaint of HTN Emergency, Vaginal Bleeding (2020 12:23)    HPI:  This is a 50yo F w/ PMHX of HTN and fibroids who presented to ED with complaints of vaginal bleeding, HA, blurry vision and dizziness. Pt reports she has had intermittent, moderately heavy vaginal bleeding for past month, hard to tell when her LMP was, but she thinks the 1st week of . Today, she developed heavy vaginal bleeding w/ large clots, soaking 12 pads in a matter of 6hrs. She was also having sharp abdominal and pelvic pain, worse than the pain she normally gets with mensuration. She denies having chest pain, palpitations, SOB, cough, sick contacts, fever, chills, N/V/D/C, dysuria, melena or hematochezia.     In ED, pt found to have BP up to 270/123, s/p labetalol 10mg IV x2, clonidine 0.5mg PO x1, and nifedipine gtt. SBP dropped to 90s. s/p 1L bolus. EKbpm, NSR, TWI in avl, qtc: 422.  Hb.3-->10.7. Trops 20->18. Cr 2.07->1.75 (pt states last Cr outpatient was 1.6). US vaginal showed intramural uterine fibroid (3.6 x 2.8 x 2.7 cm), complex septated cyst of R ovary (1.7 x 1.8 x 1.5cm), and possible adenomyosis. CTH negative. (2020 04:29)      Patient seen and evaluated at bedside. No acute events overnight except as noted.    Interval HPI: no further bleed. bp remains uncontrolled    PAST MEDICAL & SURGICAL HISTORY:  Fibroids  Hypertension  S/P       REVIEW OF SYSTEMS:  as per hpi     VITALS:  Vital Signs Last 24 Hrs  T(C): 36.7 (2020 10:56), Max: 36.7 (2020 01:43)  T(F): 98 (2020 10:56), Max: 98 (2020 01:43)  HR: 75 (2020 14:28) (63 - 80)  BP: 151/108 (2020 14:28) (149/98 - 180/100)  BP(mean): --  RR: 18 (2020 14:28) (17 - 18)  SpO2: 99% (2020 14:28) (99% - 100%)      PHYSICAL EXAM:  GENERAL: NAD, well-developed  HEAD:  Atraumatic, Normocephalic  EYES: EOMI, PERRLA, conjunctiva and sclera clear  NECK: Supple, No JVD  CHEST/LUNG: Clear to auscultation bilaterally; No wheeze  HEART: S1, S2; No murmurs, rubs, or gallops  ABDOMEN: Soft, Nontender, Nondistended; Bowel sounds present  EXTREMITIES:  2+ Peripheral Pulses, No clubbing, cyanosis, or edema  PSYCH: Normal affect  NEUROLOGY: AAOX3; non-focal  SKIN: No rashes or lesions    Consultant(s) Notes Reviewed:  [x ] YES  [ ] NO  Care Discussed with Consultants/Other Providers [ x] YES  [ ] NO    MEDS:  MEDICATIONS  (STANDING):  cloNIDine 0.2 milliGRAM(s) Oral three times a day  hydrALAZINE 50 milliGRAM(s) Oral every 8 hours  losartan 100 milliGRAM(s) Oral daily  sodium chloride 0.9% lock flush 3 milliLiter(s) IV Push every 8 hours    MEDICATIONS  (PRN):        ALLERGIES:  No Known Allergies      LABS:                                                10.7   7.09  )-----------( 257      ( 2020 06:11 )             32.8   07-02    137  |  106  |  31<H>  ----------------------------<  86  4.2   |  19<L>  |  2.03<H>    Ca    9.7      2020 06:11  Phos  4.0     07-02  Mg     2.3     07-02

## 2020-07-02 NOTE — PROGRESS NOTE ADULT - PROBLEM SELECTOR PLAN 7
1.  Name of PCP:  2.  PCP Contacted on Admission: [ ] Y    [ ] N    3.  PCP contacted at Discharge: [ ] Y    [ ] N    [ ] N/A  4.  Post-Discharge Appointment Date and Location:  5.  Summary of Handoff given to PCP:

## 2020-07-02 NOTE — PROGRESS NOTE ADULT - ASSESSMENT
50 YO F with CKD III, HTN a/w HTN emergency and vaginal bleeding. Will take over care from [facility].

## 2020-07-02 NOTE — PROGRESS NOTE ADULT - SUBJECTIVE AND OBJECTIVE BOX
Subjective: Patient seen and examined. No new events except as noted.     REVIEW OF SYSTEMS:    CONSTITUTIONAL: No weakness, fevers or chills  EYES/ENT: No visual changes;  No vertigo or throat pain   NECK: No pain or stiffness  RESPIRATORY: No cough, wheezing, hemoptysis; No shortness of breath  CARDIOVASCULAR: No chest pain or palpitations  GASTROINTESTINAL: No abdominal or epigastric pain. No nausea, vomiting, or hematemesis; No diarrhea or constipation. No melena or hematochezia.  GENITOURINARY: No dysuria, frequency or hematuria  NEUROLOGICAL: No numbness or weakness  SKIN: No itching, burning, rashes, or lesions   All other review of systems is negative unless indicated above.    MEDICATIONS:  MEDICATIONS  (STANDING):  cloNIDine 0.1 milliGRAM(s) Oral three times a day  hydrALAZINE 50 milliGRAM(s) Oral every 8 hours  losartan 100 milliGRAM(s) Oral daily  sodium chloride 0.9% lock flush 3 milliLiter(s) IV Push every 8 hours      PHYSICAL EXAM:  T(C): 36.7 (07-02-20 @ 10:56), Max: 36.8 (07-01-20 @ 11:30)  HR: 80 (07-02-20 @ 10:56) (63 - 80)  BP: 180/100 (07-02-20 @ 10:56) (126/90 - 180/100)  RR: 18 (07-02-20 @ 10:56) (17 - 18)  SpO2: 99% (07-02-20 @ 10:56) (99% - 100%)  Wt(kg): --  I&O's Summary    Height (cm): 162.6 (07-02 @ 05:36)    Appearance: Normal	  HEENT:   Normal oral mucosa, PERRL, EOMI	  Lymphatic: No lymphadenopathy , no edema  Cardiovascular: Normal S1 S2, No JVD, No murmurs , Peripheral pulses palpable 2+ bilaterally  Respiratory: Lungs clear to auscultation, normal effort 	  Gastrointestinal:  Soft, Non-tender, + BS	  Skin: No rashes, No ecchymoses, No cyanosis, warm to touch  Musculoskeletal: Normal range of motion, normal strength  Psychiatry:  Mood & affect appropriate  Ext: No edema      LABS:    CARDIAC MARKERS:                                10.7   7.09  )-----------( 257      ( 02 Jul 2020 06:11 )             32.8     07-02    137  |  106  |  31<H>  ----------------------------<  86  4.2   |  19<L>  |  2.03<H>    Ca    9.7      02 Jul 2020 06:11  Phos  4.0     07-02  Mg     2.3     07-02      proBNP:   Lipid Profile:   HgA1c:   TSH:             TELEMETRY: 	    ECG:  	  RADIOLOGY:   DIAGNOSTIC TESTING:  [ ] Echocardiogram:  [ ]  Catheterization:  [ ] Stress Test:    OTHER:

## 2020-07-02 NOTE — PROGRESS NOTE ADULT - SUBJECTIVE AND OBJECTIVE BOX
Nephrology Followup Note - [phone] - Dr Ruano / Dr Donis / Dr Arais / Dr Mazariegos / Dr Box / Dr Gibson / Dr Hollingsworth / Dr Avila  Pt seen and examined at bedside  No acute events overnight. Pt c/o insomnia and restlessness.  Denies bleeding.     Allergies:  No Known Allergies    Hospital Medications:   MEDICATIONS  (STANDING):  cloNIDine 0.2 milliGRAM(s) Oral three times a day  hydrALAZINE 50 milliGRAM(s) Oral every 8 hours  losartan 100 milliGRAM(s) Oral daily  sodium chloride 0.9% lock flush 3 milliLiter(s) IV Push every 8 hours    VITALS:  T(F): 98 (20 @ 10:56), Max: 98 (20 @ 13:00)  HR: 80 (20 @ 10:56)  BP: 180/100 (20 @ 10:56)  RR: 18 (20 @ 10:56)  SpO2: 99% (20 @ 10:56)  Wt(kg): --    Height (cm): 162.6 ( @ 05:36)  PHYSICAL EXAM:  Constitutional: NAD  HEENT: anicteric sclera, oropharynx clear, MMM  Neck: No JVD  Respiratory: CTAB, no wheezes, rales or rhonchi  Cardiovascular: S1, S2, RRR  Gastrointestinal: BS+, soft, NT/ND  Extremities: No cyanosis or clubbing. No peripheral edema  Neurological: A/O x 3, no focal deficits  Psychiatric: Normal mood, normal affect  : No CVA tenderness. No mane.   Skin: No rashes    LABS:      137  |  106  |  31<H>  ----------------------------<  86  4.2   |  19<L>  |  2.03<H>    Ca    9.7      2020 06:11  Phos  4.0       Mg     2.3           Creatinine Trend: 2.03 <--, 2.00 <--, 2.10 <--, 1.89 <--, 1.75 <--, 2.07 <--                        10.7   7.09  )-----------( 257      ( 2020 06:11 )             32.8     Urine Studies:  Urinalysis Basic - ( 2020 21:00 )    Color: COLORLESS / Appearance: CLEAR / S.016 / pH: 6.0  Gluc: NEGATIVE / Ketone: NEGATIVE  / Bili: NEGATIVE / Urobili: NORMAL   Blood: MODERATE / Protein: 200 / Nitrite: NEGATIVE   Leuk Esterase: NEGATIVE / RBC: >50 / WBC 3-5   Sq Epi: OCC / Non Sq Epi:  / Bacteria: NEGATIVE        RADIOLOGY & ADDITIONAL STUDIES:

## 2020-07-03 VITALS
OXYGEN SATURATION: 100 % | TEMPERATURE: 98 F | DIASTOLIC BLOOD PRESSURE: 102 MMHG | SYSTOLIC BLOOD PRESSURE: 145 MMHG | HEART RATE: 76 BPM | RESPIRATION RATE: 18 BRPM

## 2020-07-03 LAB
ANION GAP SERPL CALC-SCNC: 13 MMO/L — SIGNIFICANT CHANGE UP (ref 7–14)
BUN SERPL-MCNC: 37 MG/DL — HIGH (ref 7–23)
CALCIUM SERPL-MCNC: 9.9 MG/DL — SIGNIFICANT CHANGE UP (ref 8.4–10.5)
CHLORIDE SERPL-SCNC: 102 MMOL/L — SIGNIFICANT CHANGE UP (ref 98–107)
CO2 SERPL-SCNC: 21 MMOL/L — LOW (ref 22–31)
CREAT SERPL-MCNC: 2.2 MG/DL — HIGH (ref 0.5–1.3)
GLUCOSE SERPL-MCNC: 92 MG/DL — SIGNIFICANT CHANGE UP (ref 70–99)
HCT VFR BLD CALC: 36.6 % — SIGNIFICANT CHANGE UP (ref 34.5–45)
HGB BLD-MCNC: 11.1 G/DL — LOW (ref 11.5–15.5)
MCHC RBC-ENTMCNC: 27 PG — SIGNIFICANT CHANGE UP (ref 27–34)
MCHC RBC-ENTMCNC: 30.3 % — LOW (ref 32–36)
MCV RBC AUTO: 89.1 FL — SIGNIFICANT CHANGE UP (ref 80–100)
NRBC # FLD: 0 K/UL — SIGNIFICANT CHANGE UP (ref 0–0)
PLATELET # BLD AUTO: 272 K/UL — SIGNIFICANT CHANGE UP (ref 150–400)
PMV BLD: 9.7 FL — SIGNIFICANT CHANGE UP (ref 7–13)
POTASSIUM SERPL-MCNC: 4.2 MMOL/L — SIGNIFICANT CHANGE UP (ref 3.5–5.3)
POTASSIUM SERPL-SCNC: 4.2 MMOL/L — SIGNIFICANT CHANGE UP (ref 3.5–5.3)
RBC # BLD: 4.11 M/UL — SIGNIFICANT CHANGE UP (ref 3.8–5.2)
RBC # FLD: 19.9 % — HIGH (ref 10.3–14.5)
SODIUM SERPL-SCNC: 136 MMOL/L — SIGNIFICANT CHANGE UP (ref 135–145)
WBC # BLD: 7.34 K/UL — SIGNIFICANT CHANGE UP (ref 3.8–10.5)
WBC # FLD AUTO: 7.34 K/UL — SIGNIFICANT CHANGE UP (ref 3.8–10.5)

## 2020-07-03 RX ORDER — LOSARTAN POTASSIUM 100 MG/1
1 TABLET, FILM COATED ORAL
Qty: 30 | Refills: 0
Start: 2020-07-03 | End: 2020-08-01

## 2020-07-03 RX ORDER — IRBESARTAN 75 MG/1
1 TABLET ORAL
Qty: 0 | Refills: 0 | DISCHARGE

## 2020-07-03 RX ORDER — HYDRALAZINE HCL 50 MG
1 TABLET ORAL
Qty: 90 | Refills: 0
Start: 2020-07-03 | End: 2020-08-01

## 2020-07-03 RX ADMIN — Medication 50 MILLIGRAM(S): at 12:58

## 2020-07-03 RX ADMIN — Medication 0.2 MILLIGRAM(S): at 05:40

## 2020-07-03 RX ADMIN — Medication 0.2 MILLIGRAM(S): at 12:58

## 2020-07-03 RX ADMIN — LOSARTAN POTASSIUM 100 MILLIGRAM(S): 100 TABLET, FILM COATED ORAL at 05:43

## 2020-07-03 RX ADMIN — SODIUM CHLORIDE 3 MILLILITER(S): 9 INJECTION INTRAMUSCULAR; INTRAVENOUS; SUBCUTANEOUS at 12:58

## 2020-07-03 RX ADMIN — SODIUM CHLORIDE 3 MILLILITER(S): 9 INJECTION INTRAMUSCULAR; INTRAVENOUS; SUBCUTANEOUS at 05:44

## 2020-07-03 RX ADMIN — Medication 50 MILLIGRAM(S): at 05:40

## 2020-07-03 NOTE — PROGRESS NOTE ADULT - SUBJECTIVE AND OBJECTIVE BOX
[facility] - S Gagandeep / Alden S /D Delilah/ S Isauro/ S Hugo/ Matt Hollingsworth / M Gunjanu/ O Arthur  service -[phone], office [phone]  ---------------------------------------------------------------------------------------------------------------    Patient seen and examined bedside    Subjective and Objective: No overnight events, denied sob. No complaints today. feeling better    Allergies: No Known Allergies      Hospital Medications:   MEDICATIONS  (STANDING):  cloNIDine 0.2 milliGRAM(s) Oral three times a day  hydrALAZINE 50 milliGRAM(s) Oral every 8 hours  losartan 100 milliGRAM(s) Oral daily  sodium chloride 0.9% lock flush 3 milliLiter(s) IV Push every 8 hours      VITALS:  T(F): 98 (20 @ 13:00), Max: 98.3 (20 @ 05:25)  HR: 76 (20 @ 13:00)  BP: 145/102 (20 @ 13:00)  RR: 18 (20 @ 13:00)  SpO2: 100% (20 @ 13:00)  Wt(kg): --      PHYSICAL EXAM:  Constitutional: NAD  HEENT: anicteric sclera  Neck: No JVD  Respiratory: CTAB, no wheezes, rales or rhonchi  Cardiovascular: S1, S2, RRR  Gastrointestinal: BS+, soft, NT/ND  Extremities: No peripheral edema  Neurological: A/O x 3  Psychiatric: Normal mood, normal affect  : No mane.    LABS:      136  |  102  |  37<H>  ----------------------------<  92  4.2   |  21<L>  |  2.20<H>    Ca    9.9      2020 06:12  Phos  4.0     07-02  Mg     2.3     07-02      Creatinine Trend: 2.20 <--, 2.03 <--, 2.00 <--, 2.10 <--, 1.89 <--, 1.75 <--, 2.07 <--                        11.1   7.34  )-----------( 272      ( 2020 06:12 )             36.6     Urine Studies:  Urinalysis Basic - ( 2020 21:00 )    Color: COLORLESS / Appearance: CLEAR / S.016 / pH: 6.0  Gluc: NEGATIVE / Ketone: NEGATIVE  / Bili: NEGATIVE / Urobili: NORMAL   Blood: MODERATE / Protein: 200 / Nitrite: NEGATIVE   Leuk Esterase: NEGATIVE / RBC: >50 / WBC 3-5   Sq Epi: OCC / Non Sq Epi:  / Bacteria: NEGATIVE          RADIOLOGY & ADDITIONAL STUDIES:

## 2020-07-03 NOTE — PROGRESS NOTE ADULT - PROBLEM SELECTOR PLAN 2
Cr fluctuating between 1.7-2 during hospitalization.  Clinically euvolemic.  Can add Na bicarb tab for met acidosis,
Cr fluctuating between 1.7-2.2 during hospitalization.  Clinically euvolemic. k OK  met acidosis 2/2 CKD- better. no need for Na bicarb tab
Reviewed TTE   remains asymptomatic
- Hbg 11.3-->10.7  - Seen by GYN, appreciate recs  - Keep active T&S  - Monitor cbc closely   - Possible EMB
- Hbg 11.3-->10.7  - Seen by GYN, appreciate recs  - Keep active T&S  - Monitor cbc closely   - Possible EMB
- Hbg 11.3-->10.7  - Seen by GYN, appreciate recs  - Keep active T&S  - Monitor cbc closely   - Possible EMB  resolved  outpt gyn f/u
Check TTE   remains asymptomatic
Check TTE   remains asymptomatic
Cr fluctuating between 1.7-2 during hospitalization.  Clinically euvolemic.  Can add Na bicarb tab for met acidosis,
Reviewed TTE   remains asymptomatic
- Hbg 11.3-->10.7  - Seen by GYN, appreciate recs  - Keep active T&S  - Monitor cbc closely   - Possible EMB  resolved  outpt gyn f/u

## 2020-07-03 NOTE — PROGRESS NOTE ADULT - PROBLEM SELECTOR PROBLEM 1
Hypertensive emergency

## 2020-07-03 NOTE — PROGRESS NOTE ADULT - ATTENDING COMMENTS
dc planning with outpt cards and renal f/u      Advanced care planning was discussed with patient and family.  Advanced care planning forms were reviewed and discussed as appropriate.  Differential diagnosis and plan of care discussed with patient after the evaluation.   Pain assessed and judicious use of narcotics when appropriate was discussed.  Importance of Fall prevention discussed.  Counseling on Smoking and Alcohol cessation was offered when appropriate.  Counseling on Diet, exercise, and medication compliance was done.   Approx 30 minutes spent.
Advanced care planning was discussed with patient and family.  Advanced care planning forms were reviewed and discussed as appropriate.  Differential diagnosis and plan of care discussed with patient after the evaluation.   Pain assessed and judicious use of narcotics when appropriate was discussed.  Importance of Fall prevention discussed.  Counseling on Smoking and Alcohol cessation was offered when appropriate.  Counseling on Diet, exercise, and medication compliance was done.
Bryson Ruano MD  [facility]  Office [phone]  Ans Serv [phone] cell - [phone]
[facility]  Office [phone]  Ans Serv [phone]  [facility] - [phone]
Bryson Ruano MD  [facility]  Office [phone]  Ans Serv [phone] cell - [phone]
Advanced care planning was discussed with patient and family.  Advanced care planning forms were reviewed and discussed as appropriate.  Differential diagnosis and plan of care discussed with patient after the evaluation.   Pain assessed and judicious use of narcotics when appropriate was discussed.  Importance of Fall prevention discussed.  Counseling on Smoking and Alcohol cessation was offered when appropriate.  Counseling on Diet, exercise, and medication compliance was done.
Advanced care planning was discussed with patient and family.  Advanced care planning forms were reviewed and discussed as appropriate.  Differential diagnosis and plan of care discussed with patient after the evaluation.   Pain assessed and judicious use of narcotics when appropriate was discussed.  Importance of Fall prevention discussed.  Counseling on Smoking and Alcohol cessation was offered when appropriate.  Counseling on Diet, exercise, and medication compliance was done.   Approx 30 minutes spent.
Advanced care planning was discussed with patient and family.  Advanced care planning forms were reviewed and discussed as appropriate.  Differential diagnosis and plan of care discussed with patient after the evaluation.   Pain assessed and judicious use of narcotics when appropriate was discussed.  Importance of Fall prevention discussed.  Counseling on Smoking and Alcohol cessation was offered when appropriate.  Counseling on Diet, exercise, and medication compliance was done.   Approx 30 minutes spent.
dc planning with outpt cards and renal f/u      Advanced care planning was discussed with patient and family.  Advanced care planning forms were reviewed and discussed as appropriate.  Differential diagnosis and plan of care discussed with patient after the evaluation.   Pain assessed and judicious use of narcotics when appropriate was discussed.  Importance of Fall prevention discussed.  Counseling on Smoking and Alcohol cessation was offered when appropriate.  Counseling on Diet, exercise, and medication compliance was done.   Approx 30 minutes spent.

## 2020-07-03 NOTE — PROGRESS NOTE ADULT - SUBJECTIVE AND OBJECTIVE BOX
Subjective: Patient seen and examined. No new events except as noted.     REVIEW OF SYSTEMS:    CONSTITUTIONAL: No weakness, fevers or chills  EYES/ENT: No visual changes;  No vertigo or throat pain   NECK: No pain or stiffness  RESPIRATORY: No cough, wheezing, hemoptysis; No shortness of breath  CARDIOVASCULAR: No chest pain or palpitations  GASTROINTESTINAL: No abdominal or epigastric pain. No nausea, vomiting, or hematemesis; No diarrhea or constipation. No melena or hematochezia.  GENITOURINARY: No dysuria, frequency or hematuria  NEUROLOGICAL: No numbness or weakness  SKIN: No itching, burning, rashes, or lesions   All other review of systems is negative unless indicated above.    MEDICATIONS:  MEDICATIONS  (STANDING):  cloNIDine 0.2 milliGRAM(s) Oral three times a day  hydrALAZINE 50 milliGRAM(s) Oral every 8 hours  losartan 100 milliGRAM(s) Oral daily  sodium chloride 0.9% lock flush 3 milliLiter(s) IV Push every 8 hours      PHYSICAL EXAM:  T(C): 36.8 (07-03-20 @ 05:25), Max: 36.8 (07-03-20 @ 05:25)  HR: 66 (07-03-20 @ 05:25) (63 - 93)  BP: 148/96 (07-03-20 @ 05:25) (126/78 - 180/100)  RR: 18 (07-03-20 @ 05:25) (18 - 18)  SpO2: 100% (07-03-20 @ 05:25) (98% - 100%)  Wt(kg): --  I&O's Summary        Appearance: NAD	  HEENT:   Normal oral mucosa, PERRL, EOMI	  Lymphatic: No lymphadenopathy , no edema  Cardiovascular: Normal S1 S2, No JVD, No murmurs , Peripheral pulses palpable 2+ bilaterally  Respiratory: Lungs clear to auscultation, normal effort 	  Gastrointestinal:  Soft, Non-tender, + BS	  Skin: No rashes, No ecchymoses, No cyanosis, warm to touch  Musculoskeletal: Normal range of motion, normal strength  Psychiatry:  Mood & affect appropriate  Ext: No edema      LABS:    CARDIAC MARKERS:                                11.1   7.34  )-----------( 272      ( 03 Jul 2020 06:12 )             36.6     07-03    136  |  102  |  37<H>  ----------------------------<  92  4.2   |  21<L>  |  2.20<H>    Ca    9.9      03 Jul 2020 06:12  Phos  4.0     07-02  Mg     2.3     07-02      proBNP:   Lipid Profile:   HgA1c:   TSH:             TELEMETRY: 	    ECG:  	  RADIOLOGY:   DIAGNOSTIC TESTING:  [ ] Echocardiogram:  [ ]  Catheterization:  [ ] Stress Test:    OTHER:

## 2020-07-03 NOTE — PROGRESS NOTE ADULT - PROBLEM SELECTOR PLAN 3
Orders per MD
- Likely 2/2 to profound HTN   - Cr initially 2.07, improved to 1.75 after BP tx. Baseline 1.6  - Avoid nephrotoxic agents   - Monitor BMP daily  renal consulted
Orders per MD
- Likely 2/2 to profound HTN   - Cr initially 2.07, improved to 1.75 after BP tx. Baseline 1.6  - Avoid nephrotoxic agents   - Monitor BMP daily  renal consulted

## 2020-07-03 NOTE — PROGRESS NOTE ADULT - PROVIDER SPECIALTY LIST ADULT
Cardiology
Cardiology
Internal Medicine
Nephrology
Nephrology
Cardiology
Cardiology
Nephrology
Nephrology
Internal Medicine

## 2020-07-03 NOTE — PROGRESS NOTE ADULT - REASON FOR ADMISSION
HTN Emergency, Vaginal Bleeding

## 2020-07-03 NOTE — PROGRESS NOTE ADULT - PROBLEM SELECTOR PROBLEM 3
Vaginal bleeding
Vaginal bleeding
ANNIE (acute kidney injury)
Vaginal bleeding
Vaginal bleeding
ANNIE (acute kidney injury)

## 2020-07-03 NOTE — PROGRESS NOTE ADULT - PROBLEM SELECTOR PLAN 1
BP better  Cont ARB and clonidine for now.   COntinue hydralazine 50mg TID, can titrate up if need, for tighter BP Control.   Ideally would like to titrate down clonidine and increase hydralazine as needed.  low Na in diet BP better  Cont ARB and clonidine for now.   COntinue hydralazine 50mg TID, can titrate up if need, for tighter BP Control.   Ideally would like to titrate down clonidine and increase hydralazine as needed.  low Na in diet, d/w pt  pt advised to f/u w/primary renal -Dr Hollingsworth, my partner upon d/c to further titrate bp meds for optimal bp control

## 2020-07-03 NOTE — DISCHARGE NOTE NURSING/CASE MANAGEMENT/SOCIAL WORK - PATIENT PORTAL LINK FT
You can access the FollowMyHealth Patient Portal offered by [facility] by registering at the following website: [email or web address]. By joining [employer]’s FollowMyHealth portal, you will also be able to view your health information using other applications (apps) compatible with our system.

## 2020-07-03 NOTE — PROGRESS NOTE ADULT - ASSESSMENT
50 YO F with CKD III, HTN a/w HTN emergency and vaginal bleeding. Renal following for ANNIE/CKD Mx.     labs, chart reviewed

## 2020-07-03 NOTE — PROGRESS NOTE ADULT - PROBLEM SELECTOR PROBLEM 2
CKD (chronic kidney disease), stage IV
Elevated troponin level
CKD (chronic kidney disease), stage IV
Elevated troponin level
Elevated troponin level
Vaginal bleeding
CKD (chronic kidney disease), stage IV
Elevated troponin level
Vaginal bleeding

## 2020-07-04 LAB — GBM IGG SER-ACNC: <1 — SIGNIFICANT CHANGE UP

## 2020-07-21 ENCOUNTER — EMERGENCY (EMERGENCY)
Facility: HOSPITAL | Age: 50
LOS: 1 days | Discharge: HOME | End: 2020-07-21
Attending: EMERGENCY MEDICINE | Admitting: EMERGENCY MEDICINE
Payer: MEDICAID

## 2020-07-21 VITALS
TEMPERATURE: 98 F | HEART RATE: 63 BPM | OXYGEN SATURATION: 100 % | DIASTOLIC BLOOD PRESSURE: 104 MMHG | SYSTOLIC BLOOD PRESSURE: 173 MMHG | RESPIRATION RATE: 16 BRPM

## 2020-07-21 VITALS
DIASTOLIC BLOOD PRESSURE: 98 MMHG | SYSTOLIC BLOOD PRESSURE: 153 MMHG | HEART RATE: 60 BPM | OXYGEN SATURATION: 100 % | RESPIRATION RATE: 18 BRPM | TEMPERATURE: 98 F

## 2020-07-21 DIAGNOSIS — Z98.891 HISTORY OF UTERINE SCAR FROM PREVIOUS SURGERY: Chronic | ICD-10-CM

## 2020-07-21 LAB
ALBUMIN SERPL ELPH-MCNC: 3.9 G/DL — SIGNIFICANT CHANGE UP (ref 3.3–5)
ALP SERPL-CCNC: 62 U/L — SIGNIFICANT CHANGE UP (ref 40–120)
ALT FLD-CCNC: 9 U/L — SIGNIFICANT CHANGE UP (ref 4–33)
ANION GAP SERPL CALC-SCNC: 9 MMO/L — SIGNIFICANT CHANGE UP (ref 7–14)
AST SERPL-CCNC: 12 U/L — SIGNIFICANT CHANGE UP (ref 4–32)
BASOPHILS # BLD AUTO: 0.02 K/UL — SIGNIFICANT CHANGE UP (ref 0–0.2)
BASOPHILS NFR BLD AUTO: 0.3 % — SIGNIFICANT CHANGE UP (ref 0–2)
BILIRUB SERPL-MCNC: < 0.2 MG/DL — LOW (ref 0.2–1.2)
BUN SERPL-MCNC: 34 MG/DL — HIGH (ref 7–23)
CALCIUM SERPL-MCNC: 10 MG/DL — SIGNIFICANT CHANGE UP (ref 8.4–10.5)
CHLORIDE SERPL-SCNC: 108 MMOL/L — HIGH (ref 98–107)
CO2 SERPL-SCNC: 19 MMOL/L — LOW (ref 22–31)
CREAT SERPL-MCNC: 2.43 MG/DL — HIGH (ref 0.5–1.3)
EOSINOPHIL # BLD AUTO: 0.11 K/UL — SIGNIFICANT CHANGE UP (ref 0–0.5)
EOSINOPHIL NFR BLD AUTO: 1.8 % — SIGNIFICANT CHANGE UP (ref 0–6)
GLUCOSE SERPL-MCNC: 98 MG/DL — SIGNIFICANT CHANGE UP (ref 70–99)
HCT VFR BLD CALC: 37.4 % — SIGNIFICANT CHANGE UP (ref 34.5–45)
HGB BLD-MCNC: 11.7 G/DL — SIGNIFICANT CHANGE UP (ref 11.5–15.5)
IMM GRANULOCYTES NFR BLD AUTO: 0.2 % — SIGNIFICANT CHANGE UP (ref 0–1.5)
LYMPHOCYTES # BLD AUTO: 1.77 K/UL — SIGNIFICANT CHANGE UP (ref 1–3.3)
LYMPHOCYTES # BLD AUTO: 28.5 % — SIGNIFICANT CHANGE UP (ref 13–44)
MCHC RBC-ENTMCNC: 27.5 PG — SIGNIFICANT CHANGE UP (ref 27–34)
MCHC RBC-ENTMCNC: 31.3 % — LOW (ref 32–36)
MCV RBC AUTO: 87.8 FL — SIGNIFICANT CHANGE UP (ref 80–100)
MONOCYTES # BLD AUTO: 0.6 K/UL — SIGNIFICANT CHANGE UP (ref 0–0.9)
MONOCYTES NFR BLD AUTO: 9.7 % — SIGNIFICANT CHANGE UP (ref 2–14)
NEUTROPHILS # BLD AUTO: 3.69 K/UL — SIGNIFICANT CHANGE UP (ref 1.8–7.4)
NEUTROPHILS NFR BLD AUTO: 59.5 % — SIGNIFICANT CHANGE UP (ref 43–77)
NRBC # FLD: 0 K/UL — SIGNIFICANT CHANGE UP (ref 0–0)
PLATELET # BLD AUTO: 254 K/UL — SIGNIFICANT CHANGE UP (ref 150–400)
PMV BLD: 10 FL — SIGNIFICANT CHANGE UP (ref 7–13)
POTASSIUM SERPL-MCNC: 4.2 MMOL/L — SIGNIFICANT CHANGE UP (ref 3.5–5.3)
POTASSIUM SERPL-SCNC: 4.2 MMOL/L — SIGNIFICANT CHANGE UP (ref 3.5–5.3)
PROT SERPL-MCNC: 7.5 G/DL — SIGNIFICANT CHANGE UP (ref 6–8.3)
RBC # BLD: 4.26 M/UL — SIGNIFICANT CHANGE UP (ref 3.8–5.2)
RBC # FLD: 17.8 % — HIGH (ref 10.3–14.5)
SODIUM SERPL-SCNC: 136 MMOL/L — SIGNIFICANT CHANGE UP (ref 135–145)
TROPONIN T, HIGH SENSITIVITY: 15 NG/L — SIGNIFICANT CHANGE UP (ref ?–14)
TROPONIN T, HIGH SENSITIVITY: 16 NG/L — SIGNIFICANT CHANGE UP (ref ?–14)
WBC # BLD: 6.2 K/UL — SIGNIFICANT CHANGE UP (ref 3.8–10.5)
WBC # FLD AUTO: 6.2 K/UL — SIGNIFICANT CHANGE UP (ref 3.8–10.5)

## 2020-07-21 PROCEDURE — 99284 EMERGENCY DEPT VISIT MOD MDM: CPT

## 2020-07-21 PROCEDURE — 71046 X-RAY EXAM CHEST 2 VIEWS: CPT | Mod: 26

## 2020-07-21 RX ORDER — LOSARTAN POTASSIUM 100 MG/1
100 TABLET, FILM COATED ORAL ONCE
Refills: 0 | Status: COMPLETED | OUTPATIENT
Start: 2020-07-21 | End: 2020-07-21

## 2020-07-21 RX ORDER — HYDRALAZINE HCL 50 MG
50 TABLET ORAL ONCE
Refills: 0 | Status: COMPLETED | OUTPATIENT
Start: 2020-07-21 | End: 2020-07-21

## 2020-07-21 RX ADMIN — Medication 50 MILLIGRAM(S): at 21:20

## 2020-07-21 RX ADMIN — Medication 0.2 MILLIGRAM(S): at 21:20

## 2020-07-21 NOTE — ED PROVIDER NOTE - PHYSICAL EXAMINATION
General: well appearing   HEENT: neck supple, anicteric sclera  Cardiovascular: Normal s1, s2, RRR  Respiratory: CTA b/l   Abdominal: Soft, ntnd  Extremities: No swelling in LEs  Neurologic: Non focal  Psych: Awake, alert answering questions appropriately

## 2020-07-21 NOTE — ED ADULT NURSE NOTE - OBJECTIVE STATEMENT
Pt. A&Ox4, c/o chest pain and HTN starting this AM. States she was here a few weeks ago for hypertensive crisis. Pt. takes 3 BP meds three times a day. States she took 2 doses so far today. Denies dizziness, palpitations, n/v. #20g IV placed to left AC, labs sent. MD at bedside for eval. VS done as charted, breathing well on RA. Respirations even and unlabored. Will continue to monitor.

## 2020-07-21 NOTE — ED ADULT NURSE NOTE - NSIMPLEMENTINTERV_GEN_ALL_ED
Implemented All Universal Safety Interventions:  [place] to call system. Call bell, personal items and telephone within reach. Instruct patient to call for assistance. Room bathroom lighting operational. Non-slip footwear when patient is off stretcher. Physically safe environment: no spills, clutter or unnecessary equipment. Stretcher in lowest position, wheels locked, appropriate side rails in place.

## 2020-07-21 NOTE — ED PROVIDER NOTE - CLINICAL SUMMARY MEDICAL DECISION MAKING FREE TEXT BOX
pt w/ mhx of htn p/w chest pain x few hours + elevated bp. Labs, trops, bp meds, cxr. Will f/u and reassess. Likely dc.

## 2020-07-21 NOTE — ED PROVIDER NOTE - PATIENT PORTAL LINK FT
You can access the FollowMyHealth Patient Portal offered by [facility] by registering at the following website: [email or web address]. By joining [employer]’s FollowMyHealth portal, you will also be able to view your health information using other applications (apps) compatible with our system.

## 2020-07-21 NOTE — ED PROVIDER NOTE - NSFOLLOWUPINSTRUCTIONS_ED_ALL_ED_FT
You have been seen and evaluated for chest pain.    Please make sure to follow up with your PCP/cardiologist regarding this visit.    Please make sure to return to the ED for the following symptoms which include but are not limited to persistent chest pain radiating to jaw/arm, nausea/vomiting, sweating or any change in baseline that is concerning.

## 2020-07-21 NOTE — ED PROVIDER NOTE - PROGRESS NOTE DETAILS
Mamadou Zamudio MD, PGY3: Patient received at resident sign out. Pt has no chest pain now, delta trop negative. Pt has PMD follow up, now safe for discharge with PCP follow up. I have given the pt strict return and follow up precautions. The patient has been provided with a copy of all pertinent results. The patient has been informed of all concerning signs and symptoms to return to Emergency Department, the necessity to follow up with PMD/Clinic/follow up provided within 2-3 days was explained, and the patient reports understanding of above with capacity and insight. Mamadou Zamudio MD, PGY3: Patient received at resident sign out. cRE 2.4, the last number was 2.2, very mild elevation without electyrolytes abnormality. needs follow up with nephrologist, pt has the appointment this week. Pt has no chest pain now, delta trop negative. Pt has PMD follow up, now safe for discharge with PCP follow up. I have given the pt strict return and follow up precautions. The patient has been provided with a copy of all pertinent results. The patient has been informed of all concerning signs and symptoms to return to Emergency Department, the necessity to follow up with PMD/Clinic/follow up provided within 2-3 days was explained, and the patient reports understanding of above with capacity and insight.

## 2020-07-21 NOTE — ED ADULT NURSE REASSESSMENT NOTE - NS ED NURSE REASSESS COMMENT FT1
Received report from day shift RN, pt resting comfortably. Denies any acute complaints at this time, night time home meds given at this time.

## 2020-07-21 NOTE — ED PROVIDER NOTE - NS ED ROS FT
General: no fever, chills  HENT: no nasal congestion  Eyes: no visual changes, no blurred vision  Neck: no neck pain  CV: +chest pain  Resp: no difficulty breathing  Abdominal: no nausea, no vomiting, no diarrhea, no abdominal pain  MSK: no muscle aches  Neuro: no headaches  Skin: no rashes

## 2020-07-21 NOTE — ED PROVIDER NOTE - OBJECTIVE STATEMENT
48 y/o F w/ mhx of htn p/w chest pain x 6 hours. Patient states that she felt CP this am, described as pressure, non radiating, constant, located 48 y/o F w/ mhx of htn p/w chest pain x 6 hours. Patient states that she felt CP this am, described as pressure, non radiating, constant, located mid sternal. Patient has not taken anything for pain, no aggravating/alleviating factors. Patients BP elevated to 170s and was told to return to ED. Patient does not endorse addl sx.

## 2020-07-21 NOTE — ED ADULT TRIAGE NOTE - CHIEF COMPLAINT QUOTE
C/o chest pain since yesterday, recently here for hypertensive emergency. PMH mitral regurg, HTN. Given 162ASA by EMS.

## 2020-07-21 NOTE — ED PROVIDER NOTE - ATTENDING CONTRIBUTION TO CARE
49 year old with htn presents with elevated bp and chest tightness. no fever. ddx includes acs htn emergency msk pain ptx pna.  low risk for pe. will check labs trop ekg give home bp meds reassess

## 2020-07-22 PROBLEM — D21.9 BENIGN NEOPLASM OF CONNECTIVE AND OTHER SOFT TISSUE, UNSPECIFIED: Chronic | Status: ACTIVE | Noted: 2020-06-29

## 2021-01-15 NOTE — ED ADULT TRIAGE NOTE - NS ED NURSE BANDS TYPE
· Recent echo on 01/15/2021 was significant for PA pressure of 75 mm Hg  · May be secondary or exaggerated by history of DIXIE  Plan:  · Monitor  Name band;

## 2021-02-06 ENCOUNTER — EMERGENCY (EMERGENCY)
Facility: HOSPITAL | Age: 51
LOS: 0 days | Discharge: HOME | End: 2021-02-06
Attending: EMERGENCY MEDICINE
Payer: MEDICAID

## 2021-02-06 VITALS — HEIGHT: 64 IN

## 2021-02-06 DIAGNOSIS — I10 ESSENTIAL (PRIMARY) HYPERTENSION: ICD-10-CM

## 2021-02-06 DIAGNOSIS — Z71.1 PERSON WITH FEARED HEALTH COMPLAINT IN WHOM NO DIAGNOSIS IS MADE: ICD-10-CM

## 2021-02-06 DIAGNOSIS — F32.9 MAJOR DEPRESSIVE DISORDER, SINGLE EPISODE, UNSPECIFIED: ICD-10-CM

## 2021-02-06 DIAGNOSIS — Z98.891 HISTORY OF UTERINE SCAR FROM PREVIOUS SURGERY: Chronic | ICD-10-CM

## 2021-02-06 PROCEDURE — 99282 EMERGENCY DEPT VISIT SF MDM: CPT

## 2021-02-06 NOTE — ED ADULT TRIAGE NOTE - CHIEF COMPLAINT QUOTE
51 y/o female with no PMH. BIBA: aS PER EMS " My mother is depressed and is saying she wants to kill her self. The mother denies wanting to kill herself.

## 2021-02-06 NOTE — ED ADULT NURSE NOTE - CHIEF COMPLAINT QUOTE
49 y/o female with no PMH. BIBA: aS PER EMS " My mother is depressed and is saying she wants to kill her self. The mother denies wanting to kill herself.

## 2021-02-06 NOTE — ED PROVIDER NOTE - CLINICAL SUMMARY MEDICAL DECISION MAKING FREE TEXT BOX
Pt without any desire to harm self - is A&Ox3 and would like to go - will dc with outpt follow up wPMD for Depression issue as needed.

## 2021-02-06 NOTE — ED PROVIDER NOTE - PATIENT PORTAL LINK FT
You can access the FollowMyHealth Patient Portal offered by [facility] by registering at the following website: [email or web address]. By joining [employer]’s FollowMyHealth portal, you will also be able to view your health information using other applications (apps) compatible with our system.

## 2021-02-06 NOTE — ED PROVIDER NOTE - OBJECTIVE STATEMENT
50 year old female with 50 year old female with PMH of HTN, fibroids presenting to ED due to depression - pt states that she had been a little sad recently and had confided that fact to her daughter who then promptly called ambulance and had pt brought into hospital for further evaluation. Pt denies any suicidal thought and denies any plans for self harm - stating she has a lot to live for.

## 2021-02-06 NOTE — ED ADULT NURSE NOTE - OBJECTIVE STATEMENT
Pt [id number] denies Si/HI/ hallucations assessed by MD tesfaye who determined no harm to self, pt discharged

## 2021-03-02 NOTE — DISCHARGE NOTE PROVIDER - HOSPITAL COURSE
POSTOP Check    SUBJECTIVE:  Patient is reporting minimal pain. She was able to tolerate water with no nausea/vomting. No ambulation. Kat in place. No fever/chills/chest/pain/shortness of breath     Operative  complications: None     OBJECTIVE BEGIN:    Visit Vitals  /61   Pulse 62   Temp 99.1 °F (37.3 °C) (Temporal)   Resp 14   Ht 5' 4.96\" (1.65 m)   Wt 71.2 kg (156 lb 15.5 oz)   SpO2 95%   BMI 26.15 kg/m²     I/O last 3 completed shifts:  In: 1000 [I.V.:1000]  Out: -   I/O this shift:  In: 490 [P.O.:15; I.V.:475]  Out: 500 [Urine:400; Blood:100]          Constitutional Exam: in no acute distress, comfortable  Abdomen: soft and appropriately tender, incision is c/d/I  Vaginal: No bleeding noted on sue pad   : kat draining clear urine     1. ASSESSMENT: : Micheline Walsh is a 64 Yo POD # 0 s/p Laparoscopic left salpingo-oophorectomy, Right salpingectomy, Lysis of adhesions    PLAN:  1) Vitals: wnl  2) Postoperative -   -Discussed postoperative expecations  -Encouraged incentive spirometry   -Encouraged ambulation   -Regular diet   -SCDs while in bed   -Pain currently controlled with pain regimen   -Repeat CBC in AM   -Discontinue kat 6-8 hour postoperatively   Jh Finch MD   OB/GYN, PGY4    This is a 50yo F w/ PMHX of HTN and fibroids who presented to ED with complaints of vaginal bleeding, HA, blurry vision and dizziness. Found have BP of 270/123, with ANNIE. Admitted to telemetry for HTN Emergency.         Hypertensive emergency.      Admitted to telemetry for further observation  -  q4 Neuro checks, - q4 vitals, monitor BP closely     Seen by MICU, not a candidate at that time . Initially on nifedipine drip but discontinued .     cards evaluation performed by / Colton  - Blood pressure improved on clonidine 0.1 TID  and losartan 100 mg daily. HCTZ 25 mg added on 7/1 6/30 -Transthoracic Echocardiogram -. Normal mitral valve. Minimal mitral regurgitation.    2. Moderate concentric left ventricular hypertrophy. Normal left ventricular systolic function. No segmental    wall motion abnormalities. Mild diastolic dysfunction (Stage I). The right ventricle is not well visualized; grossly    normal right ventricular systolic function.          Vaginal bleeding    .  Plan: - Hbg 11.3-->10.7. Hemoglobin has remained stable during admission     - Seen by GYN, appreciate recs    - Keep active T&S    - Monitor cbc closely     - Possible EMB.         ANNIE (acute kidney injury).      Plan: - Likely 2/2 to profound HTN     - Cr initially 2.07, improved to 1.75 after BP tx. Baseline 1.6    - Avoid nephrotoxic agents , - Monitor BMP daily    renal consulted Dr. Nguyen         Elevated troponin level.  Plan: - Likely elevated in setting of demand ischemia 2/2 to HTN Emergency    - Downtrending , - Pt chest pain free            : Medication management.  Plan: -Med rec pharmacist emailed, f/u med rec.         Need for prophylactic measure. Plan: - DVT ppx: SCDs in setting of vaginal bleeding. This is a 48yo F w/ PMHX of HTN and fibroids who presented to ED with complaints of vaginal bleeding, HA, blurry vision and dizziness. Found have BP of 270/123, with ANNIE. Admitted to telemetry for HTN Emergency.         Hypertensive emergency.      Admitted to telemetry for further observation  -  q4 Neuro checks, - q4 vitals, monitor BP closely     Seen by MICU, not a candidate at that time . Initially on nifedipine drip but discontinued .     cards evaluation performed by / Colton  - Blood pressure improved on clonidine 0.1 TID  and losartan 100 mg daily. Started on hydralazine 50mg Q 8 hrs  on 7/1 6/30 -Transthoracic Echocardiogram -. Normal mitral valve. Minimal mitral regurgitation.    2. Moderate concentric left ventricular hypertrophy. Normal left ventricular systolic function. No segmental    wall motion abnormalities. Mild diastolic dysfunction (Stage I). The right ventricle is not well visualized; grossly    normal right ventricular systolic function.          Vaginal bleeding    .  Plan: - Hbg 11.3-->10.7. Hemoglobin has remained stable during admission     - Seen by GYN, appreciate recs    - Keep active T&S    - Monitor cbc closely     - hemoglobin stable - for outpt endometrial biopsy with GYN         ANNIE (acute kidney injury).      Plan: - Likely 2/2 to profound HTN     - Cr initially 2.07, improved to 1.75 after BP tx. Baseline 1.6    - Avoid nephrotoxic agents , - Monitor BMP daily    renal consulted Dr. stephens     Outpt nephrology follow up with Dr. Hollingsworth        Elevated troponin level.  Plan: - Likely elevated in setting of demand ischemia 2/2 to HTN Emergency    - Downtrending , - Pt chest pain free            : Medication management.  Plan: -Med rec pharmacist emailed, f/u med rec.         Need for prophylactic measure. Plan: - DVT ppx: SCDs in setting of vaginal bleeding. Ms. Madrid is a 48yo F w/ PMHX of HTN and fibroids who presented to ED with complaints of vaginal bleeding, HA, blurry vision and dizziness. Found have BP of 270/123, with ANNIE. Admitted on 6/29/20 to telemetry for HTN Emergency, BP up to 270/123.  found to have BP up to 270/123. Initially received IV Labetalol, clonidine , and placed on nifedipine drip. Transitioned over to oral regimen with clonidine, losartan, and hydralazine  improvement in blood pressure. TTE with EF 63%,  Normal mitral valve. Minimal mitral regurgitation,  Moderate concentric left ventricular hypertrophy, No segmental wall motion abnormalities,  Mild diastolic dysfunction (Stage I). The right ventricle is not well visualized; grossly normal right ventricular systolic function. Cardiology consulted and following. Nephrology consulted and following. GYN consulted with Bedside transvaginal ultrasound with results of intramural uterine fibroid (3.6 x 2.8 x 2.7 cm), complex septated cyst of R ovary (1.7 x 1.8 x 1.5cm), and possible adenomyosis. Bleeding decreasing. EKG normal. Troponin likely elevated in setting of demand ischemia 2/2 to HTN Emergency, down trending from 20->18. ANNIE improving with creatinine going from 2.07->1.75             Hypertensive emergency.      Admitted to telemetry for further observation  -  q4 Neuro checks, - q4 vitals, monitor BP closely     Seen by MICU, not a candidate at that time . Initially on nifedipine drip but discontinued .     cards evaluation performed by / Colton  - Blood pressure improved on clonidine 0.1 TID  and losartan 100 mg daily. Started on hydralazine 50mg Q 8 hrs  on 7/1 6/30 -Transthoracic Echocardiogram -. Normal mitral valve. Minimal mitral regurgitation.    2. Moderate concentric left ventricular hypertrophy. Normal left ventricular systolic function. No segmental    wall motion abnormalities. Mild diastolic dysfunction (Stage I). The right ventricle is not well visualized; grossly    normal right ventricular systolic function.          Vaginal bleeding    .  Plan: - Hbg 11.3-->10.7. Hemoglobin has remained stable during admission     - Seen by GYN, appreciate recs    - Keep active T&S    - Monitor cbc closely     - hemoglobin stable - for outpt endometrial biopsy with GYN         ANNIE (acute kidney injury).      Plan: - Likely 2/2 to profound HTN     - Cr initially 2.07, improved to 1.75 after BP tx. Baseline 1.6    - Avoid nephrotoxic agents , - Monitor BMP daily    renal consulted Dr. stephens     Outpt nephrology follow up with Dr. Hollingsworth        Elevated troponin level.  Plan: - Likely elevated in setting of demand ischemia 2/2 to HTN Emergency    - Downtrending , - Pt chest pain free                     Need for prophylactic measure. Plan: - DVT ppx: SCDs in setting of vaginal bleeding. Ms. Madrid is a 50 yo F w/ PMHX of Hypertension, and fibroids who presented to ED with complaints of vaginal bleeding, HA, blurry vision and dizziness. On exam she was found have BP of 270/123. MICU consulted, patient not accepted, admitted on 6/29 to telemetry for HTN Emergency, with ANNIE. Initially received IV Labetalol, clonidine , and then was placed on nifedipine drip. Blood pressure closely monitored with improvement.  Transitioned over to oral regimen with clonidine, losartan, and hydralazine with blood pressure improvement .Troponins elevated, Cardiology Dr Segura consulted and following. Troponin likely elevated in setting of demand ischemia 2/2 to HTN Emergency, down trending appropriately. Nephrology Dr Ruano consulted and following. ANNIE slowly improving. EKG normal. TTE with EF 63%. Normal mitral valve. Minimal mitral regurgitation,  Moderate concentric left ventricular hypertrophy, No segmental wall motion abnormalities,  Mild diastolic dysfunction (Stage I). The right ventricle is not well visualized; grossly normal right ventricular systolic function. GYN consulted, transvaginal ultrasound with results of intramural uterine fibroid (3.6 x 2.8 x 2.7 cm), complex septated cyst of R ovary (1.7 x 1.8 x 1.5cm), and possible adenomyosis. Bleeding decreased. Advised to follow up with outpatient GYN, no acute intervention needed at time. Afebrile, vital signs stable, with /90's to 123/66 after antihypertensive meds given. Tolerating diet. Voiding without difficulty. Denies any headache, dizziness, chest pain, dyspnea, nausea, vomiting, or any other c/o's. Patient medically stable.        Dispo: Patient case discussed with Attending Physician Dr Lambert who agrees the patient is medically stable and optimized for discharge 7/3/20 with follow up next week in the office with outpatient nephrologist Dr Hollingsworth, with primary care physician, and with outpatient Gynecologist for further management of uterine fibroid, and right ovarian cyst. All medications were reviewed and prescriptions were sent to mutually agreed upon pharmacy. Ms. Mdarid is a 50 yo F w/ PMHX of Hypertension, and fibroids who presented to ED with complaints of vaginal bleeding, HA, blurry vision and dizziness. On exam she was found have BP of 270/123. MICU consulted, patient not accepted, admitted on 6/29 to telemetry for HTN Emergency, with ANNIE. Initially received IV Labetalol, clonidine , and then was placed on nifedipine drip. Blood pressure closely monitored with improvement.  Transitioned over to oral regimen with clonidine, losartan, and hydralazine with blood pressure improvement .Troponins elevated, Cardiology Dr Segura consulted and following. Troponin likely elevated in setting of demand ischemia 2/2 to HTN Emergency, down trending appropriately. Nephrology Dr Ruano consulted and following. ANNIE slowly improving. EKG normal. TTE with EF 63%. Normal mitral valve. Minimal mitral regurgitation,  Moderate concentric left ventricular hypertrophy, No segmental wall motion abnormalities,  Mild diastolic dysfunction (Stage I). The right ventricle is not well visualized; grossly normal right ventricular systolic function. GYN consulted, transvaginal ultrasound with results of intramural uterine fibroid (3.6 x 2.8 x 2.7 cm), complex septated cyst of R ovary (1.7 x 1.8 x 1.5cm), and possible adenomyosis. Bleeding decreased. Advised to follow up with outpatient GYN, no acute intervention needed at time. Afebrile, vital signs stable, with /90's to 123/66 after antihypertensive meds given. Tolerating diet. Voiding without difficulty. Denies any headache, dizziness, chest pain, dyspnea, nausea, vomiting, or any other c/o's. Patient medically stable.        Dispo: Patient case discussed with Attending Physician Dr Lambert who agrees the patient is medically stable and optimized for discharge 7/3/20 with follow up next week in the office with outpatient nephrologist Dr Hollingsworth, with primary care physician, and with outpatient Gynecologist for further management of uterine fibroid, and right ovarian cyst. Patient instructed to monitor her blood pressure twice daily and bring results with her to follow up appointment with her physicians. All medications were reviewed and prescriptions were sent to mutually agreed upon pharmacy. Ms. Madrid is a 48 yo F w/ PMHX of Hypertension, and fibroids who presented to ED with complaints of vaginal bleeding, HA, blurry vision and dizziness. On exam she was found have BP of 270/123. MICU consulted, patient not accepted, admitted on 6/29 to telemetry for HTN Emergency, with ANNIE. Initially received IV Labetalol, clonidine , and then was placed on nifedipine drip. Blood pressure closely monitored with improvement.  Transitioned over to oral regimen with clonidine, losartan, and hydralazine with blood pressure improvement .Troponins elevated, Cardiology Dr Segura consulted and following. Troponin likely elevated in setting of demand ischemia 2/2 to HTN Emergency, down trending appropriately. Nephrology Dr Ruano consulted and following. ANNIE slowly improving. EKG normal. TTE with EF 63%. Normal mitral valve. Minimal mitral regurgitation,  Moderate concentric left ventricular hypertrophy, No segmental wall motion abnormalities,  Mild diastolic dysfunction (Stage I). The right ventricle is not well visualized; grossly normal right ventricular systolic function. GYN consulted, transvaginal ultrasound with results of intramural uterine fibroid (3.6 x 2.8 x 2.7 cm), complex septated cyst of R ovary (1.7 x 1.8 x 1.5cm), and possible adenomyosis. Bleeding decreased. Advised to follow up with outpatient GYN, no acute intervention needed at time. Afebrile, vital signs stable, with /90's to 123/66 after antihypertensive meds given. Tolerating diet. Voiding without difficulty. Denies any headache, dizziness, chest pain, dyspnea, nausea, vomiting, or any other c/o's. Patient medically stable.        Dispo: Patient case discussed with Attending Physician Dr Lambert who agrees the patient is medically stable and optimized for discharge 7/3/20 with follow up next week in the office with outpatient nephrologist Dr Hollingsworth, with primary care physician, and with outpatient Gynecologist for further management of uterine fibroid, and right ovarian cyst. Patient instructed to monitor her blood pressure twice daily and bring results with her to follow up appointment with her physicians. All medications were reviewed and prescriptions were sent to mutually agreed upon pharmacy.                Advanced care planning was discussed with patient and family.  Advanced care planning forms were reviewed and discussed as appropriate.    Differential diagnosis and plan of care discussed with patient after the evaluation.     Pain assessed and judicious use of narcotics when appropriate was discussed.    Importance of Fall prevention discussed.    Counseling on Smoking and Alcohol cessation was offered when appropriate.    Counseling on Diet, exercise, and medication compliance was done.     Approx 45 minutes spent.

## 2021-05-06 ENCOUNTER — EMERGENCY (EMERGENCY)
Facility: HOSPITAL | Age: 51
LOS: 1 days | Discharge: HOME | End: 2021-05-06
Attending: EMERGENCY MEDICINE | Admitting: EMERGENCY MEDICINE
Payer: MEDICAID

## 2021-05-06 VITALS
TEMPERATURE: 98 F | DIASTOLIC BLOOD PRESSURE: 96 MMHG | RESPIRATION RATE: 16 BRPM | SYSTOLIC BLOOD PRESSURE: 156 MMHG | OXYGEN SATURATION: 100 % | HEART RATE: 56 BPM | HEIGHT: 64 IN

## 2021-05-06 DIAGNOSIS — Z98.891 HISTORY OF UTERINE SCAR FROM PREVIOUS SURGERY: Chronic | ICD-10-CM

## 2021-05-06 LAB
ALBUMIN SERPL ELPH-MCNC: 4 G/DL — SIGNIFICANT CHANGE UP (ref 3.3–5)
ALP SERPL-CCNC: 67 U/L — SIGNIFICANT CHANGE UP (ref 40–120)
ALT FLD-CCNC: 6 U/L — SIGNIFICANT CHANGE UP (ref 4–33)
ANION GAP SERPL CALC-SCNC: 11 MMOL/L — SIGNIFICANT CHANGE UP (ref 7–14)
AST SERPL-CCNC: 10 U/L — SIGNIFICANT CHANGE UP (ref 4–32)
BASOPHILS # BLD AUTO: 0.02 K/UL — SIGNIFICANT CHANGE UP (ref 0–0.2)
BASOPHILS NFR BLD AUTO: 0.2 % — SIGNIFICANT CHANGE UP (ref 0–2)
BILIRUB SERPL-MCNC: 0.2 MG/DL — SIGNIFICANT CHANGE UP (ref 0.2–1.2)
BLOOD GAS VENOUS COMPREHENSIVE RESULT: SIGNIFICANT CHANGE UP
BUN SERPL-MCNC: 22 MG/DL — SIGNIFICANT CHANGE UP (ref 7–23)
CALCIUM SERPL-MCNC: 9.5 MG/DL — SIGNIFICANT CHANGE UP (ref 8.4–10.5)
CHLORIDE SERPL-SCNC: 109 MMOL/L — HIGH (ref 98–107)
CO2 SERPL-SCNC: 19 MMOL/L — LOW (ref 22–31)
CREAT SERPL-MCNC: 2.16 MG/DL — HIGH (ref 0.5–1.3)
EOSINOPHIL # BLD AUTO: 0.09 K/UL — SIGNIFICANT CHANGE UP (ref 0–0.5)
EOSINOPHIL NFR BLD AUTO: 1.1 % — SIGNIFICANT CHANGE UP (ref 0–6)
GLUCOSE SERPL-MCNC: 124 MG/DL — HIGH (ref 70–99)
HCT VFR BLD CALC: 35.9 % — SIGNIFICANT CHANGE UP (ref 34.5–45)
HGB BLD-MCNC: 11.1 G/DL — LOW (ref 11.5–15.5)
IANC: 5.26 K/UL — SIGNIFICANT CHANGE UP (ref 1.5–8.5)
IMM GRANULOCYTES NFR BLD AUTO: 0.2 % — SIGNIFICANT CHANGE UP (ref 0–1.5)
LYMPHOCYTES # BLD AUTO: 2.21 K/UL — SIGNIFICANT CHANGE UP (ref 1–3.3)
LYMPHOCYTES # BLD AUTO: 26.7 % — SIGNIFICANT CHANGE UP (ref 13–44)
MCHC RBC-ENTMCNC: 27.8 PG — SIGNIFICANT CHANGE UP (ref 27–34)
MCHC RBC-ENTMCNC: 30.9 GM/DL — LOW (ref 32–36)
MCV RBC AUTO: 89.8 FL — SIGNIFICANT CHANGE UP (ref 80–100)
MONOCYTES # BLD AUTO: 0.69 K/UL — SIGNIFICANT CHANGE UP (ref 0–0.9)
MONOCYTES NFR BLD AUTO: 8.3 % — SIGNIFICANT CHANGE UP (ref 2–14)
NEUTROPHILS # BLD AUTO: 5.26 K/UL — SIGNIFICANT CHANGE UP (ref 1.8–7.4)
NEUTROPHILS NFR BLD AUTO: 63.5 % — SIGNIFICANT CHANGE UP (ref 43–77)
NRBC # BLD: 0 /100 WBCS — SIGNIFICANT CHANGE UP
NRBC # FLD: 0 K/UL — SIGNIFICANT CHANGE UP
PLATELET # BLD AUTO: 316 K/UL — SIGNIFICANT CHANGE UP (ref 150–400)
POTASSIUM SERPL-MCNC: 4.2 MMOL/L — SIGNIFICANT CHANGE UP (ref 3.5–5.3)
POTASSIUM SERPL-SCNC: 4.2 MMOL/L — SIGNIFICANT CHANGE UP (ref 3.5–5.3)
PROT SERPL-MCNC: 7.6 G/DL — SIGNIFICANT CHANGE UP (ref 6–8.3)
RBC # BLD: 4 M/UL — SIGNIFICANT CHANGE UP (ref 3.8–5.2)
RBC # FLD: 15.4 % — HIGH (ref 10.3–14.5)
SODIUM SERPL-SCNC: 139 MMOL/L — SIGNIFICANT CHANGE UP (ref 135–145)
WBC # BLD: 8.29 K/UL — SIGNIFICANT CHANGE UP (ref 3.8–10.5)
WBC # FLD AUTO: 8.29 K/UL — SIGNIFICANT CHANGE UP (ref 3.8–10.5)

## 2021-05-06 PROCEDURE — 99285 EMERGENCY DEPT VISIT HI MDM: CPT

## 2021-05-06 PROCEDURE — 73130 X-RAY EXAM OF HAND: CPT | Mod: 26,LT

## 2021-05-06 PROCEDURE — 93971 EXTREMITY STUDY: CPT | Mod: 26,LT

## 2021-05-06 RX ORDER — ACETAMINOPHEN 500 MG
975 TABLET ORAL ONCE
Refills: 0 | Status: COMPLETED | OUTPATIENT
Start: 2021-05-06 | End: 2021-05-06

## 2021-05-06 RX ORDER — OXYCODONE HYDROCHLORIDE 5 MG/1
5 TABLET ORAL ONCE
Refills: 0 | Status: DISCONTINUED | OUTPATIENT
Start: 2021-05-06 | End: 2021-05-06

## 2021-05-06 RX ADMIN — Medication 975 MILLIGRAM(S): at 23:17

## 2021-05-06 RX ADMIN — Medication 50 MILLIGRAM(S): at 23:17

## 2021-05-06 RX ADMIN — Medication 110 MILLIGRAM(S): at 20:23

## 2021-05-06 RX ADMIN — OXYCODONE HYDROCHLORIDE 5 MILLIGRAM(S): 5 TABLET ORAL at 20:14

## 2021-05-06 NOTE — ED PROVIDER NOTE - OBJECTIVE STATEMENT
51 y/o female with pmhx of HTN presents to ED c/o left hand swelling and redness x 2 days. Atraumatic, pt states she woke up with her hand swollen, unclear if she slept on that arm. Pt sent from urgent care to rule out blood clot. No hx of vaccines or IV in arm, recent travel or hx of dvt in the past. Pt c/o severe pain. No fevers, chills, discharge, abrasions, weakness, numbness or tingling.

## 2021-05-06 NOTE — ED PROVIDER NOTE - CLINICAL SUMMARY MEDICAL DECISION MAKING FREE TEXT BOX
51 y/o female with pmhx of HTN presents to ED c/o left hand swelling and redness x 2 days. Atraumatic, pt states she woke up with her hand swollen, unclear if she slept on that arm. Pt sent from urgent care to rule out blood clot. No hx of vaccines or IV in arm, recent travel or hx of dvt in the past. Pt c/o severe pain. No fevers, chills, discharge, abrasions, weakness, numbness or tingling. on exam pt with left hand erythema and swelling, no fluctuance, no crepitus. full rom of all joints. will refrain from NSAIDs given pt states she has had a history of "kidney problems" will follow up labs. concern for cellulitis- plan to check labs, give IV abx, XR, US r/o dvt given sent from  however low suspicion.

## 2021-05-06 NOTE — ED PROVIDER NOTE - SKIN TEMP
diffuse swelling of left hand involving palm with erythema, no streaking, no crepitus, no fluctuance +tender to touch. full rom of all joints neurovascularly intact/warm

## 2021-05-06 NOTE — ED PROVIDER NOTE - NSFOLLOWUPINSTRUCTIONS_ED_ALL_ED_FT
Take Prednisone daily as prescribed  Follow up with Orthopedics  Follow up with Hand Surgery    Worsening, continued or new concerning symptoms, fever, redness, increased swelling, return to the emergency department.

## 2021-05-06 NOTE — ED PROVIDER NOTE - PROGRESS NOTE DETAILS
GWEN Harrison- as per Dr. Dunn, hand US at bedside with inflamed tendon sheath. will provide prednisone x 5 days. discussed concern for possible cellulitis as well given erythema and pt amenable to taking antibiotics. GWEN Harrison- as per Dr. Dunn, hand US at bedside performed by Dr. Noel with inflamed tendon sheath. will provide prednisone x 5 days. discussed concern for possible cellulitis as well given erythema and pt amenable to taking antibiotics.

## 2021-05-06 NOTE — ED PROVIDER NOTE - ATTENDING CONTRIBUTION TO CARE
I performed a face to face evaluation of this patient and obtained a history and performed a full exam.  I agree with the history, physical exam and plan of the PA.    Brief HPI:  50 pmh HTN presents to ED c/o left hand swelling and redness x 2 days.  Patient states pain is located mostly in left wrist, worse with movement.  Denies numbness, tingling, trauma, fever.  Denies ivdu or sti history.  Sent from urgent care to rule out blood clot in arm.  No history of dvt/pe in past.      Vitals:   Reviewed    Exam:    GEN:  Non-toxic appearing, non-distressed, speaking full sentences, non-diaphoretic, AAOx3  HEENT:  NCAT, neck supple, EOMI, PERRLA, sclera anicteric, no conjunctival pallor or injection, no stridor, normal voice, no tonsillar exudate, uvula midline  CV:  regular rhythm and rate, s1/s2 audible, no murmurs, rubs or gallops, peripheral pulses 2+ and symmetric  PULM:  non-labored respirations, lungs clear to auscultation bilaterally, no wheezes, crackles or rales  ABD:  non distended, non-tender, no rebound, no guarding, negative Hampton's sign, bowel sounds normal, no cvat  LUE:  mild edema of hand dorsum.  1 cm area of erythema over left radial wrist dorsum, point area of tenderness in this area.  FDP/FDS/Extensors intact in digits 2-5.  APL/FPL/EPB intact in digit 1.  AIN/PIN/U 5/5.  SILT m/r/u.  Two point discrimination intact in all digits.  Radial pulse 2+.  Brisk cap refill in all digits.  NEURO:  AAOx3, CN II-XII intact, motor 5/5 in upper and lower extremities bilaterally, sensation grossly intact in extremities and trunk  SKIN:  warm, dry, no rash or vesicles     A/P:  50 pmh HTN presents to ED c/o left hand swelling and redness x 2 days.  VSS AF.  Exam with point area of erythema, tenderness over left radial wrist dorsum.  Exam is not c/w gangloin or abscess.  Low c/f septic arthritis.  Exam not c/w hand cellulitis given area of erythema is focal.  Low c/f dvt at this time.  Possible synovitis of extensor tendon coursing through wrist, low c/f infectious etiology given exam however.  Will obtain x-ray, ultrasound, labs, analgesia.  Dispo pending.

## 2021-05-06 NOTE — ED ADULT TRIAGE NOTE - CHIEF COMPLAINT QUOTE
Triaged by APRIL Solorzano  "Left hand swelling & pain started yesterday, sent from urgent care for hypertension 187/110, given tylenol. Hx HTN"

## 2021-05-06 NOTE — ED PROVIDER NOTE - PATIENT PORTAL LINK FT
You can access the FollowMyHealth Patient Portal offered by [facility] by registering at the following website: [email or web address]. By joining [employer]’s FollowMyHealth portal, you will also be able to view your health information using other applications (apps) compatible with our system.

## 2021-10-10 NOTE — ED PROVIDER NOTE - NS ED ATTENDING STATEMENT MOD
S1-S2 I have personally seen and examined this patient.  I have fully participated in the care of this patient. I have reviewed all pertinent clinical information, including history, physical exam, plan and the Resident’s note and agree except as noted.

## 2022-09-11 ENCOUNTER — EMERGENCY (EMERGENCY)
Facility: HOSPITAL | Age: 52
LOS: 0 days | Discharge: HOME | End: 2022-09-11
Attending: EMERGENCY MEDICINE

## 2022-09-11 VITALS
SYSTOLIC BLOOD PRESSURE: 129 MMHG | DIASTOLIC BLOOD PRESSURE: 83 MMHG | OXYGEN SATURATION: 100 % | HEART RATE: 60 BPM | RESPIRATION RATE: 17 BRPM

## 2022-09-11 VITALS
WEIGHT: 177.91 LBS | DIASTOLIC BLOOD PRESSURE: 82 MMHG | OXYGEN SATURATION: 100 % | HEART RATE: 60 BPM | RESPIRATION RATE: 16 BRPM | TEMPERATURE: 98 F | HEIGHT: 64 IN | SYSTOLIC BLOOD PRESSURE: 142 MMHG

## 2022-09-11 DIAGNOSIS — N18.9 CHRONIC KIDNEY DISEASE, UNSPECIFIED: ICD-10-CM

## 2022-09-11 DIAGNOSIS — I12.9 HYPERTENSIVE CHRONIC KIDNEY DISEASE WITH STAGE 1 THROUGH STAGE 4 CHRONIC KIDNEY DISEASE, OR UNSPECIFIED CHRONIC KIDNEY DISEASE: ICD-10-CM

## 2022-09-11 DIAGNOSIS — M10.9 GOUT, UNSPECIFIED: ICD-10-CM

## 2022-09-11 DIAGNOSIS — Z98.891 HISTORY OF UTERINE SCAR FROM PREVIOUS SURGERY: Chronic | ICD-10-CM

## 2022-09-11 DIAGNOSIS — M79.674 PAIN IN RIGHT TOE(S): ICD-10-CM

## 2022-09-11 PROCEDURE — 99284 EMERGENCY DEPT VISIT MOD MDM: CPT

## 2022-09-11 RX ORDER — COLCHICINE 0.6 MG
1.2 TABLET ORAL ONCE
Refills: 0 | Status: COMPLETED | OUTPATIENT
Start: 2022-09-11 | End: 2022-09-11

## 2022-09-11 RX ORDER — MORPHINE SULFATE 50 MG/1
4 CAPSULE, EXTENDED RELEASE ORAL ONCE
Refills: 0 | Status: DISCONTINUED | OUTPATIENT
Start: 2022-09-11 | End: 2022-09-11

## 2022-09-11 RX ORDER — KETOROLAC TROMETHAMINE 30 MG/ML
60 SYRINGE (ML) INJECTION ONCE
Refills: 0 | Status: DISCONTINUED | OUTPATIENT
Start: 2022-09-11 | End: 2022-09-11

## 2022-09-11 RX ORDER — DICLOFENAC SODIUM 30 MG/G
1 GEL TOPICAL
Qty: 1 | Refills: 0
Start: 2022-09-11 | End: 2022-09-15

## 2022-09-11 RX ORDER — COLCHICINE 0.6 MG
1 TABLET ORAL
Qty: 10 | Refills: 0
Start: 2022-09-11 | End: 2022-09-15

## 2022-09-11 RX ORDER — INDOMETHACIN 50 MG
1 CAPSULE ORAL
Qty: 12 | Refills: 0
Start: 2022-09-11 | End: 2022-09-14

## 2022-09-11 RX ORDER — DOCUSATE SODIUM 100 MG
1 CAPSULE ORAL
Qty: 14 | Refills: 0
Start: 2022-09-11 | End: 2022-09-17

## 2022-09-11 RX ADMIN — Medication 1.2 MILLIGRAM(S): at 19:17

## 2022-09-11 RX ADMIN — Medication 60 MILLIGRAM(S): at 19:10

## 2022-09-11 RX ADMIN — MORPHINE SULFATE 4 MILLIGRAM(S): 50 CAPSULE, EXTENDED RELEASE ORAL at 19:10

## 2022-09-11 NOTE — ED PROVIDER NOTE - PATIENT PORTAL LINK FT
You can access the FollowMyHealth Patient Portal offered by [facility] by registering at the following website: [email or web address]. By joining [employer]’s FollowMyHealth portal, you will also be able to view your health information using other applications (apps) compatible with our system.

## 2022-09-11 NOTE — ED ADULT NURSE NOTE - OBJECTIVE STATEMENT
Patient A&OX3, breathing even and unlabored ion room air, no acute respiratory distress noted. PMHX HTN, CKD, gout BIBA as per patient c/o R foot pain and swelling starting today. right foot edema noted. Denies fever or chills, trauma. Patient states she had this before and it is a gout flare up.

## 2022-09-11 NOTE — ED PROVIDER NOTE - PHYSICAL EXAMINATION
PE:  CONSTITUTIONAL: Well-appearing; non-toxic; in no apparent distress  HEAD: Normocephalic; atraumatic  EYES: PERRL; EOM intact   ENMT: External appears normal; normal oropharynx  NECK: Supple; non-tender; no cervical lymphadenopathy  CARD: Normal S1, S2; no murmurs, rubs, or gallops  RESP: Normal chest excursion with respiration; breath sounds clear and equal bilaterally; no wheezes, rhonchi, or rales  ABD: Soft, non-distended; non-tender; no palpable organomegaly, no palpable hernias  EXT: Right big toe tender to palpation at the mtp   SKIN: Warm, dry, no rash  NEURO:  No focal neurological deficiencies

## 2022-09-11 NOTE — ED PROVIDER NOTE - OBJECTIVE STATEMENT
51y F with PMHx of HTN, gout, and mild CKD, who presents to the ED complaining of right big toe pain tender to palpation. Patient is symptomatic for gout, but denies any other recurrent symptoms.

## 2022-09-11 NOTE — ED PROVIDER NOTE - NSFOLLOWUPINSTRUCTIONS_ED_ALL_ED_FT
Take the COLCHICINE completely as prescribed.    Take the INDOMETHACIN completely as prescribed.    Taker PERCOCET as needed for pain.  It make cause CONSTIPATION so take with stool softener DOCUSATE.     You can apply the DICLOFENAC gel for pain as well.       Follow up with your regular doctor.

## 2024-12-27 ENCOUNTER — EMERGENCY (EMERGENCY)
Facility: HOSPITAL | Age: 54
LOS: 0 days | Discharge: HOME | End: 2024-12-27
Attending: STUDENT IN AN ORGANIZED HEALTH CARE EDUCATION/TRAINING PROGRAM
Payer: MEDICAID

## 2024-12-27 VITALS
DIASTOLIC BLOOD PRESSURE: 92 MMHG | HEART RATE: 61 BPM | RESPIRATION RATE: 16 BRPM | TEMPERATURE: 98 F | SYSTOLIC BLOOD PRESSURE: 134 MMHG | OXYGEN SATURATION: 99 %

## 2024-12-27 VITALS
HEART RATE: 67 BPM | TEMPERATURE: 98 F | DIASTOLIC BLOOD PRESSURE: 82 MMHG | RESPIRATION RATE: 17 BRPM | SYSTOLIC BLOOD PRESSURE: 153 MMHG | OXYGEN SATURATION: 99 % | WEIGHT: 182.1 LBS | HEIGHT: 64 IN

## 2024-12-27 DIAGNOSIS — N93.9 ABNORMAL UTERINE AND VAGINAL BLEEDING, UNSPECIFIED: ICD-10-CM

## 2024-12-27 DIAGNOSIS — Z98.891 HISTORY OF UTERINE SCAR FROM PREVIOUS SURGERY: Chronic | ICD-10-CM

## 2024-12-27 DIAGNOSIS — R53.1 WEAKNESS: ICD-10-CM

## 2024-12-27 DIAGNOSIS — N18.30 CHRONIC KIDNEY DISEASE, STAGE 3 UNSPECIFIED: ICD-10-CM

## 2024-12-27 DIAGNOSIS — I12.9 HYPERTENSIVE CHRONIC KIDNEY DISEASE WITH STAGE 1 THROUGH STAGE 4 CHRONIC KIDNEY DISEASE, OR UNSPECIFIED CHRONIC KIDNEY DISEASE: ICD-10-CM

## 2024-12-27 DIAGNOSIS — D25.9 LEIOMYOMA OF UTERUS, UNSPECIFIED: ICD-10-CM

## 2024-12-27 LAB
ALBUMIN SERPL ELPH-MCNC: 3.3 G/DL — SIGNIFICANT CHANGE UP (ref 3.3–5)
ALP SERPL-CCNC: 73 U/L — SIGNIFICANT CHANGE UP (ref 40–120)
ALT FLD-CCNC: 18 U/L — SIGNIFICANT CHANGE UP (ref 12–78)
ANION GAP SERPL CALC-SCNC: 4 MMOL/L — LOW (ref 5–17)
APTT BLD: 33.2 SEC — SIGNIFICANT CHANGE UP (ref 24.5–35.6)
AST SERPL-CCNC: 12 U/L — LOW (ref 15–37)
BASOPHILS # BLD AUTO: 0.02 K/UL — SIGNIFICANT CHANGE UP (ref 0–0.2)
BASOPHILS NFR BLD AUTO: 0.4 % — SIGNIFICANT CHANGE UP (ref 0–2)
BILIRUB SERPL-MCNC: 0.3 MG/DL — SIGNIFICANT CHANGE UP (ref 0.2–1.2)
BUN SERPL-MCNC: 21 MG/DL — SIGNIFICANT CHANGE UP (ref 7–23)
CALCIUM SERPL-MCNC: 9.7 MG/DL — SIGNIFICANT CHANGE UP (ref 8.5–10.1)
CHLORIDE SERPL-SCNC: 115 MMOL/L — HIGH (ref 96–108)
CO2 SERPL-SCNC: 22 MMOL/L — SIGNIFICANT CHANGE UP (ref 22–31)
CREAT SERPL-MCNC: 2.2 MG/DL — HIGH (ref 0.5–1.3)
EGFR: 26 ML/MIN/1.73M2 — LOW
EOSINOPHIL # BLD AUTO: 0.09 K/UL — SIGNIFICANT CHANGE UP (ref 0–0.5)
EOSINOPHIL NFR BLD AUTO: 1.6 % — SIGNIFICANT CHANGE UP (ref 0–6)
GLUCOSE SERPL-MCNC: 96 MG/DL — SIGNIFICANT CHANGE UP (ref 70–99)
HCT VFR BLD CALC: 39 % — SIGNIFICANT CHANGE UP (ref 34.5–45)
HGB BLD-MCNC: 12.6 G/DL — SIGNIFICANT CHANGE UP (ref 11.5–15.5)
IMM GRANULOCYTES NFR BLD AUTO: 0.4 % — SIGNIFICANT CHANGE UP (ref 0–0.9)
INR BLD: 1.03 RATIO — SIGNIFICANT CHANGE UP (ref 0.85–1.16)
LYMPHOCYTES # BLD AUTO: 1.36 K/UL — SIGNIFICANT CHANGE UP (ref 1–3.3)
LYMPHOCYTES # BLD AUTO: 24.3 % — SIGNIFICANT CHANGE UP (ref 13–44)
MCHC RBC-ENTMCNC: 28.6 PG — SIGNIFICANT CHANGE UP (ref 27–34)
MCHC RBC-ENTMCNC: 32.3 G/DL — SIGNIFICANT CHANGE UP (ref 32–36)
MCV RBC AUTO: 88.6 FL — SIGNIFICANT CHANGE UP (ref 80–100)
MONOCYTES # BLD AUTO: 0.53 K/UL — SIGNIFICANT CHANGE UP (ref 0–0.9)
MONOCYTES NFR BLD AUTO: 9.5 % — SIGNIFICANT CHANGE UP (ref 2–14)
NEUTROPHILS # BLD AUTO: 3.58 K/UL — SIGNIFICANT CHANGE UP (ref 1.8–7.4)
NEUTROPHILS NFR BLD AUTO: 63.8 % — SIGNIFICANT CHANGE UP (ref 43–77)
NRBC # BLD: 0 /100 WBCS — SIGNIFICANT CHANGE UP (ref 0–0)
PLATELET # BLD AUTO: 252 K/UL — SIGNIFICANT CHANGE UP (ref 150–400)
POTASSIUM SERPL-MCNC: 4.9 MMOL/L — SIGNIFICANT CHANGE UP (ref 3.5–5.3)
POTASSIUM SERPL-SCNC: 4.9 MMOL/L — SIGNIFICANT CHANGE UP (ref 3.5–5.3)
PROT SERPL-MCNC: 7.5 GM/DL — SIGNIFICANT CHANGE UP (ref 6–8.3)
PROTHROM AB SERPL-ACNC: 11.9 SEC — SIGNIFICANT CHANGE UP (ref 9.9–13.4)
RBC # BLD: 4.4 M/UL — SIGNIFICANT CHANGE UP (ref 3.8–5.2)
RBC # FLD: 14.8 % — HIGH (ref 10.3–14.5)
SODIUM SERPL-SCNC: 141 MMOL/L — SIGNIFICANT CHANGE UP (ref 135–145)
WBC # BLD: 5.6 K/UL — SIGNIFICANT CHANGE UP (ref 3.8–10.5)
WBC # FLD AUTO: 5.6 K/UL — SIGNIFICANT CHANGE UP (ref 3.8–10.5)

## 2024-12-27 PROCEDURE — 99285 EMERGENCY DEPT VISIT HI MDM: CPT

## 2024-12-27 PROCEDURE — 76830 TRANSVAGINAL US NON-OB: CPT | Mod: 26

## 2024-12-27 PROCEDURE — 93010 ELECTROCARDIOGRAM REPORT: CPT

## 2024-12-27 RX ORDER — ACETAMINOPHEN 500MG 500 MG/1
975 TABLET, COATED ORAL ONCE
Refills: 0 | Status: COMPLETED | OUTPATIENT
Start: 2024-12-27 | End: 2024-12-27

## 2024-12-27 RX ORDER — PROGESTERONE 200 MG/1
100 CAPSULE ORAL ONCE
Refills: 0 | Status: COMPLETED | OUTPATIENT
Start: 2024-12-27 | End: 2024-12-27

## 2024-12-27 RX ORDER — PROGESTERONE 200 MG/1
2 CAPSULE ORAL
Qty: 28 | Refills: 0
Start: 2024-12-27 | End: 2025-01-09

## 2024-12-27 RX ORDER — ACETAMINOPHEN 500MG 500 MG/1
1000 TABLET, COATED ORAL ONCE
Refills: 0 | Status: DISCONTINUED | OUTPATIENT
Start: 2024-12-27 | End: 2024-12-27

## 2024-12-27 RX ADMIN — ACETAMINOPHEN 500MG 975 MILLIGRAM(S): 500 TABLET, COATED ORAL at 20:59

## 2024-12-27 RX ADMIN — PROGESTERONE 100 MILLIGRAM(S): 200 CAPSULE ORAL at 21:35

## 2024-12-27 NOTE — ED ADULT TRIAGE NOTE - CHIEF COMPLAINT QUOTE
pt c/o lower abdominal pain, vaginal bleeding for 2 months, c/o of dizziness and chest pain  in the ED waiting area.  history of fibroids and htn pt c/o lower abdominal pain, vaginal bleeding for 2 months, c/o of dizziness and chest pain  in the ED waiting area.  state she wants to see a OB immediately .history of fibroids and htn

## 2024-12-27 NOTE — ED PROVIDER NOTE - NSFOLLOWUPINSTRUCTIONS_ED_ALL_ED_FT
Please follow up with an Obstetrician/Gynecologist at your scheduled appointment on January 8th.    Medication has been sent to your pharmacy, please take as directed.     Please continue taking your medications as prescribed by your doctors.     Please return to the emergency department if you experience any of the following symptoms:    Fever  Chest pain  Difficulty breathing  Abdominal pain  Nausea  Vomiting  Worsening vaginal bleeding

## 2024-12-27 NOTE — ED ADULT NURSE NOTE - OBJECTIVE STATEMENT
55 YO female, a&Ox4, presents to ED c/o lower abdominal pain, vaginal bleeding for 2 months, c/o of dizziness and chest pain. PMH: fibroids and htn

## 2024-12-27 NOTE — ED ADULT NURSE NOTE - CHIEF COMPLAINT QUOTE
pt c/o lower abdominal pain, vaginal bleeding for 2 months, c/o of dizziness and chest pain  in the ED waiting area.  state she wants to see a OB immediately .history of fibroids and htn

## 2024-12-27 NOTE — ED ADULT NURSE NOTE - NSFALLUNIVINTERV_ED_ALL_ED
Bed/Stretcher in lowest position, wheels locked, appropriate side rails in place/Call bell, personal items and telephone in reach/Instruct patient to call for assistance before getting out of bed/chair/stretcher/Non-slip footwear applied when patient is off stretcher/[place] to call system/Physically safe environment - no spills, clutter or unnecessary equipment/Purposeful proactive rounding/Room/bathroom lighting operational, light cord in reach

## 2024-12-27 NOTE — ED PROVIDER NOTE - PATIENT PORTAL LINK FT
You can access the FollowMyHealth Patient Portal offered by [facility] by registering at the following website: [email or web address]. By joining [employer]’s FollowMyHealth portal, you will also be able to view your health information using other applications (apps) compatible with our system.

## 2024-12-27 NOTE — ED PROVIDER NOTE - PROGRESS NOTE DETAILS
Jeffery Sahu, EM NP Fellow: Per nursing patient refusing EKG from triage, also refused PIV insertion during lab draw. Patient educated on importance of IV in place 2/2 heavy vaginal bleeding and concern that patient could decompensate. Patient still refusing PIV. CBC WNL and non-actionable at this time. CMP with kidney function at baseline compared to previous labs in [facility]. Pelvic US pending. MD Montalvo aware. Selvin DO: Spoke with Dr. Singh over the phone to discuss case. Currently recommending oral Prometrium 100 mg once per day. Discussed with patient who states she does not have peanut allergy. Patient agrees with plan and feels well. Instructed to follow-up with her OB/GYN at her appointment on January 8.

## 2024-12-27 NOTE — ED PROVIDER NOTE - PHYSICAL EXAMINATION
CONSTITUTIONAL: A&O x3, NAD, resting comfortably, well groomed  HEAD: Normocephalic, atraumatic.   EYES: clear sclera and conjunctiva, PERRL, EOM intact. No conjunctival pallor.  NECK:  Airway patent. Neck Supple.   CARDIAC: RRR, normal S1/2, no murmurs, rubs, gallops. CW non-TTP, no CW deformity.  RESPIRATORY: CTA B/L, good aeration. No wheezing, rales, adventitious breath sounds. No accessory muscle use.    ABDOMEN: soft, non-distended; lower abdomen-TTP, No RUQ/LUQ pain, no rebound tenderness or guarding, BS + x4 quadrants, no pulsating masses, scars. No CVA tenderness.  /GYN: exam performed with chaperone MD Montalvo: + blood clots on external exam, no rashes, lesions excoriation, of the perineum and vaginal introitus. Speculum exam unable to visualized cervix, large amount of bright red blood with pooling noted.   MSK: Moving all extremities.  No bony or soft tissue tenderness. Full ROM and Strength 5+/5+ B/L upper and lower extremities.   SKIN: Warm, dry, color WNL, no turgor, erythema or rashes. Cap refill < 2 sec.  NEURO: A&Ox3, interactive, cooperative, no focal deficits. CONSTITUTIONAL: A&O x3, NAD, resting comfortably, well groomed  HEAD: Normocephalic, atraumatic.   EYES: clear sclera and conjunctiva, PERRL, EOM intact. No conjunctival pallor.  NECK:  Airway patent. Neck Supple.   CARDIAC: RRR, normal S1/2, no murmurs, rubs, gallops. CW non-TTP, no CW deformity.  RESPIRATORY: CTA B/L, good aeration. No wheezing, rales, adventitious breath sounds. No accessory muscle use.    ABDOMEN: soft, non-distended; lower abdomen-TTP, No RUQ/LUQ pain, no rebound tenderness or guarding, BS + x4 quadrants, no pulsating masses, scars. No CVA tenderness.  /GYN: exam performed with chaperone DO Montalvo: + small blood clots on external exam, no rashes, lesions excoriation, of the perineum and vaginal introitus. Speculum exam unable to visualized cervix, small to moderate amount of blood with pooling noted.   MSK: Moving all extremities.  No bony or soft tissue tenderness. Full ROM and Strength 5+/5+ B/L upper and lower extremities.   SKIN: Warm, dry, color WNL, no turgor, erythema or rashes. Cap refill < 2 sec.  NEURO: A&Ox3, interactive, cooperative, no focal deficits.

## 2024-12-27 NOTE — ED ADULT NURSE REASSESSMENT NOTE - NS ED NURSE REASSESS COMMENT FT1
Pt received from Kathy CHEN, resting in bed in NAD. Pt c/o lower abd pain, Dr. Montalvo made aware. Pt transported to US via stretcher. Plan of care ongoing.

## 2024-12-27 NOTE — ED PROVIDER NOTE - CLINICAL SUMMARY MEDICAL DECISION MAKING FREE TEXT BOX
UGO Laughlin NP Fellow: Patient is 53yo F PMHx uterine fibroids, HTN, CKD3 presents with c/o vaginal bleeding x2 months, heavier since  yesterday, now today "saturating 10 pads over several hours this afternoon with large clots." Patient endorses generalized weakness and lower abdominal pain. Patient states she has been to her OBGYN x2 ([facility]) and [facility] multiple times where she has US showing fibroids and was given medroxyprogesterone 10mg daily x10 days which helped her symptoms but then bleeding returned so was given Norethindrone 5 mg 3x daily for 5 days which also helped the bleeding but then it returned. Patient endorses "I need something to stop the bleeding now, I will not get a blood transfusion!" Patient denies fevers, chills, CP, SOB, palpitations, passing out.   Patient well appearing, nontoxic, and in no apparent distress. Physical exam as above, pertinent for abdomen is soft, non-distended with diffuse lower abdominal pain and speculum exam with large amount of bright red blood with pooling noted.   Will order labs and transvaginal US and reassess. Will likely consult GYN for recommendations after results.   disposition pending US and GYN recommendations.  MD Montalvo aware of and in agreement with plan of care.

## 2025-01-06 NOTE — ED PROVIDER NOTE - ATTENDING APP SHARED VISIT CONTRIBUTION OF CARE
[Negative] : Heme/Lymph
I have personally performed a face to face medical and diagnostic evaluation of the patient. I have discussed with and reviewed the JOSE's note and agree with the History, ROS, Physical Exam and MDM unless otherwise indicated. A brief summary of my personal evaluation and impression can be found below. I actively participated in the comanagement of this patient with the JOSE. I have personally reviewed all orders, study/imaging results, medication orders. I discussed indications for consultant evaluation and consultant recommendations with the JOSE when applicable, and have discussed the disposition plan with the JOSE.     54-year-old female with a past medical history of hypertension, CKD stage III, uterine fibroids presenting with vaginal bleeding. Patient states she has had persistent vaginal bleeding for 2 months. Patient has been evaluated by numerous emergency departments as well as OB/GYN. Patient has received medroxyprogesterone 10 mg daily for 10 days, norethindrone 5 mg 3 times a day for 5 days. Each time she used medications the bleeding stopped however returned. Patient has been counseled prior on withdrawal bleeding from these medications. Patient states her OB/GYN at [facility] has not been helpful. Patient states today she saturated over 10 pads over several hours. Patient also passing large clots. Denies chest pain, difficulty breathing, nausea, vomiting, abdominal pain, pelvic pain, dysuria, fevers.    General: Appears well and nontoxic  Mentation: AAO x 3  psych: mood appropriate  HEENT: airway patent, conjunctivae clear bilaterally  Resp: symmetric chest rise, no resp distress, breath sounds CTA bilaterally  Cardio: RRR, no m/r/g  GI: soft/nondistended/nontender  : no CVA tenderness, see physical exam section for pelvic exam performed by Jeffery Sahu NP and chaperoned by myself  Neuro: sensation and motor function grossly intact  Skin: no cyanosis, no jaundice  MSK: normal movement of all extremities  Lymph/Vasc: no LE edema     Labs, coags, TVUS, OBGYN consult.

## 2025-01-27 PROBLEM — Z00.00 ENCOUNTER FOR PREVENTIVE HEALTH EXAMINATION: Status: ACTIVE | Noted: 2025-01-27

## 2025-01-27 PROBLEM — N18.30 CHRONIC KIDNEY DISEASE, STAGE 3 UNSPECIFIED: Chronic | Status: ACTIVE | Noted: 2024-12-27

## 2025-01-30 ENCOUNTER — NON-APPOINTMENT (OUTPATIENT)
Age: 55
End: 2025-01-30

## 2025-01-30 ENCOUNTER — APPOINTMENT (OUTPATIENT)
Dept: GYNECOLOGIC ONCOLOGY | Facility: CLINIC | Age: 55
End: 2025-01-30
Payer: MEDICAID

## 2025-01-30 VITALS
HEIGHT: 64 IN | TEMPERATURE: 98 F | RESPIRATION RATE: 16 BRPM | OXYGEN SATURATION: 97 % | SYSTOLIC BLOOD PRESSURE: 163 MMHG | HEART RATE: 71 BPM | BODY MASS INDEX: 30.73 KG/M2 | DIASTOLIC BLOOD PRESSURE: 93 MMHG | WEIGHT: 180 LBS

## 2025-01-30 DIAGNOSIS — N95.0 POSTMENOPAUSAL BLEEDING: ICD-10-CM

## 2025-01-30 DIAGNOSIS — N93.9 ABNORMAL UTERINE AND VAGINAL BLEEDING, UNSPECIFIED: ICD-10-CM

## 2025-01-30 DIAGNOSIS — Z80.49 FAMILY HISTORY OF MALIGNANT NEOPLASM OF OTHER GENITAL ORGANS: ICD-10-CM

## 2025-01-30 DIAGNOSIS — D49.59 NEOPLASM UNSPECIFIED BEHAVIOR OF OTHER GENITOURINARY ORGAN: ICD-10-CM

## 2025-01-30 DIAGNOSIS — N18.9 CHRONIC KIDNEY DISEASE, UNSPECIFIED: ICD-10-CM

## 2025-01-30 DIAGNOSIS — I10 ESSENTIAL (PRIMARY) HYPERTENSION: ICD-10-CM

## 2025-01-30 PROCEDURE — 99459 PELVIC EXAMINATION: CPT

## 2025-01-30 PROCEDURE — 99205 OFFICE O/P NEW HI 60 MIN: CPT

## 2025-01-30 RX ORDER — LABETALOL HYDROCHLORIDE 100 MG/1
100 TABLET, FILM COATED ORAL
Refills: 0 | Status: ACTIVE | COMMUNITY

## 2025-01-31 LAB — HPV HIGH+LOW RISK DNA PNL CVX: NOT DETECTED

## 2025-02-03 LAB — CYTOLOGY CVX/VAG DOC THIN PREP: NORMAL

## 2025-02-06 ENCOUNTER — OUTPATIENT (OUTPATIENT)
Dept: OUTPATIENT SERVICES | Facility: HOSPITAL | Age: 55
LOS: 1 days | End: 2025-02-06
Payer: COMMERCIAL

## 2025-02-06 DIAGNOSIS — Z98.891 HISTORY OF UTERINE SCAR FROM PREVIOUS SURGERY: Chronic | ICD-10-CM

## 2025-02-06 DIAGNOSIS — N95.9 UNSPECIFIED MENOPAUSAL AND PERIMENOPAUSAL DISORDER: ICD-10-CM

## 2025-02-06 PROCEDURE — 88360 TUMOR IMMUNOHISTOCHEM/MANUAL: CPT

## 2025-02-06 PROCEDURE — 88342 IMHCHEM/IMCYTCHM 1ST ANTB: CPT | Mod: 26,59

## 2025-02-06 PROCEDURE — 88321 CONSLTJ&REPRT SLD PREP ELSWR: CPT

## 2025-02-06 PROCEDURE — 88341 IMHCHEM/IMCYTCHM EA ADD ANTB: CPT | Mod: 26,59

## 2025-02-06 PROCEDURE — 88341 IMHCHEM/IMCYTCHM EA ADD ANTB: CPT

## 2025-02-06 PROCEDURE — 88342 IMHCHEM/IMCYTCHM 1ST ANTB: CPT

## 2025-02-06 PROCEDURE — 88360 TUMOR IMMUNOHISTOCHEM/MANUAL: CPT | Mod: 26

## 2025-02-19 ENCOUNTER — OUTPATIENT (OUTPATIENT)
Dept: OUTPATIENT SERVICES | Facility: HOSPITAL | Age: 55
LOS: 1 days | End: 2025-02-19

## 2025-02-19 VITALS
SYSTOLIC BLOOD PRESSURE: 141 MMHG | DIASTOLIC BLOOD PRESSURE: 91 MMHG | HEIGHT: 64 IN | TEMPERATURE: 97 F | RESPIRATION RATE: 16 BRPM | OXYGEN SATURATION: 98 % | WEIGHT: 179.9 LBS | HEART RATE: 62 BPM

## 2025-02-19 DIAGNOSIS — I10 ESSENTIAL (PRIMARY) HYPERTENSION: ICD-10-CM

## 2025-02-19 DIAGNOSIS — N93.9 ABNORMAL UTERINE AND VAGINAL BLEEDING, UNSPECIFIED: ICD-10-CM

## 2025-02-19 DIAGNOSIS — D49.59 NEOPLASM OF UNSPECIFIED BEHAVIOR OF OTHER GENITOURINARY ORGAN: ICD-10-CM

## 2025-02-19 DIAGNOSIS — Z98.890 OTHER SPECIFIED POSTPROCEDURAL STATES: Chronic | ICD-10-CM

## 2025-02-19 DIAGNOSIS — Z98.891 HISTORY OF UTERINE SCAR FROM PREVIOUS SURGERY: Chronic | ICD-10-CM

## 2025-02-19 LAB
HCG UR QL: NEGATIVE — SIGNIFICANT CHANGE UP
HCT VFR BLD CALC: 35.7 % — SIGNIFICANT CHANGE UP (ref 34.5–45)
HGB BLD-MCNC: 10.9 G/DL — LOW (ref 11.5–15.5)
MCHC RBC-ENTMCNC: 26.4 PG — LOW (ref 27–34)
MCHC RBC-ENTMCNC: 30.5 G/DL — LOW (ref 32–36)
MCV RBC AUTO: 86.4 FL — SIGNIFICANT CHANGE UP (ref 80–100)
NRBC # BLD AUTO: 0 K/UL — SIGNIFICANT CHANGE UP (ref 0–0)
NRBC # FLD: 0 K/UL — SIGNIFICANT CHANGE UP (ref 0–0)
NRBC BLD AUTO-RTO: 0 /100 WBCS — SIGNIFICANT CHANGE UP (ref 0–0)
PLATELET # BLD AUTO: 344 K/UL — SIGNIFICANT CHANGE UP (ref 150–400)
RBC # BLD: 4.13 M/UL — SIGNIFICANT CHANGE UP (ref 3.8–5.2)
RBC # FLD: 14.1 % — SIGNIFICANT CHANGE UP (ref 10.3–14.5)
WBC # BLD: 5.69 K/UL — SIGNIFICANT CHANGE UP (ref 3.8–10.5)
WBC # FLD AUTO: 5.69 K/UL — SIGNIFICANT CHANGE UP (ref 3.8–10.5)

## 2025-02-19 NOTE — H&P PST ADULT - NSICDXPASTMEDICALHX_GEN_ALL_CORE_FT
PAST MEDICAL HISTORY:  Abnormal vaginal bleeding     Fibroids     Hypertension     Stage 3 chronic kidney disease

## 2025-02-19 NOTE — H&P PST ADULT - GENITOURINARY COMMENTS
Last episode about 5-6 weeks ago, refer to HPI.  CKD x10-12 years reports to be stable creatinine ranges between 2.0 - 2.4, last creatinine in sunrise 2.2 from 12/2024 not examined pt reports vaginal bleeding stopped early Jan 2025, refer to HPI.  CKD x10-12 years reports to be stable creatinine ranges between 2.0 - 2.4, last creatinine in sunrise 2.2 from 12/2024

## 2025-02-19 NOTE — H&P PST ADULT - HISTORY OF PRESENT ILLNESS
53 y/o  (twins) referred from Dr. Orellana, for AUB with an outside EMB demonstrating "atypical glandular proliferation" on endometrial biopsy, not classified as sufficient for AEH.  She presented to Dr. Orellana for AUB for the last few months, reports she previously was still having regular periods. She was seen in the ED x 2.  ?  24- Pelvic US-  Uterus- 11.5 x 6.2 x 7.1 cm, multiple heterogenous masses are consistent with leiomyomata these measure 5.2 x 3.7 x3.9 cm anteriorly 2.7 x 2.0 x 3.2 cm posteriorly and 2.3 x 1.7 x 2.2 cm posterolateral on the right  Endometrial Lining- 9 mm  Ovaries are visualized  ?  24[facility] [place] - EM 8mm  ?  2025- Endometrial Biopsy in office (outside path)- atypical glandular epithelium, additional sampling with D&C recommended.    Pt is scheduled for diagnostic hysteroscopy dilation and curettage. 55 yo with abnormal vaginal bleeding presents to pre surgical testing.  Pt reports her last period was 10/2024, then experienced prolonged vaginal bleeding x 2-3 weeks 11/2024 which prompted her to go to ED 11/2024 and 12/2024.   ?  11/26/24- Pelvic US-  Uterus- 11.5 x 6.2 x 7.1 cm, multiple heterogenous masses are consistent with leiomyomata these measure 5.2 x 3.7 x3.9 cm anteriorly 2.7 x 2.0 x 3.2 cm posteriorly and 2.3 x 1.7 x 2.2 cm posterolateral on the right  Endometrial Lining- 9 mm  Ovaries are visualized  ?  12/27/24- SImilar US findings on [facility] sono in ER at [place] - EM 8mm  ?  1/2025- Endometrial Biopsy in office (outside path)- atypical glandular epithelium, additional sampling with D&C recommended.    Pt is scheduled for diagnostic hysteroscopy dilation and curettage.

## 2025-02-19 NOTE — H&P PST ADULT - NSICDXFAMILYHX_GEN_ALL_CORE_FT
FAMILY HISTORY:  FH: HTN (hypertension)    Mother  Still living? No  FH: cancer, Age at diagnosis: Age Unknown

## 2025-02-19 NOTE — H&P PST ADULT - PROBLEM SELECTOR PLAN 2
Pt instructed to take clonidine, labetalol, and amlodipine AM of surgery with a sip of water, pt able to verbalize understanding.

## 2025-02-19 NOTE — H&P PST ADULT - PROBLEM SELECTOR PLAN 1
Pt is scheduled for diagnostic hysteroscopy dilation and curettage on 2/26/25.  Verbal and written pre op instructions reviewed with patient and pt able to verbalize understanding.     CBC UCG sent.  Sterile cup given, pt instructed to bring urine sample AM of surgery for UCG and pt able to verbalize understanding.

## 2025-02-24 LAB — SURGICAL PATHOLOGY STUDY: SIGNIFICANT CHANGE UP

## 2025-02-26 ENCOUNTER — APPOINTMENT (OUTPATIENT)
Dept: GYNECOLOGIC ONCOLOGY | Facility: HOSPITAL | Age: 55
End: 2025-02-26

## 2025-02-26 ENCOUNTER — TRANSCRIPTION ENCOUNTER (OUTPATIENT)
Age: 55
End: 2025-02-26

## 2025-02-26 ENCOUNTER — OUTPATIENT (OUTPATIENT)
Dept: OUTPATIENT SERVICES | Facility: HOSPITAL | Age: 55
LOS: 1 days | Discharge: HOME | End: 2025-02-26
Payer: MEDICAID

## 2025-02-26 VITALS
SYSTOLIC BLOOD PRESSURE: 131 MMHG | OXYGEN SATURATION: 99 % | DIASTOLIC BLOOD PRESSURE: 85 MMHG | HEART RATE: 57 BPM | RESPIRATION RATE: 12 BRPM

## 2025-02-26 VITALS
HEIGHT: 64 IN | OXYGEN SATURATION: 100 % | RESPIRATION RATE: 16 BRPM | SYSTOLIC BLOOD PRESSURE: 128 MMHG | DIASTOLIC BLOOD PRESSURE: 87 MMHG | TEMPERATURE: 98 F | WEIGHT: 179.9 LBS | HEART RATE: 56 BPM

## 2025-02-26 DIAGNOSIS — Z98.890 OTHER SPECIFIED POSTPROCEDURAL STATES: Chronic | ICD-10-CM

## 2025-02-26 DIAGNOSIS — Z98.891 HISTORY OF UTERINE SCAR FROM PREVIOUS SURGERY: Chronic | ICD-10-CM

## 2025-02-26 DIAGNOSIS — N93.9 ABNORMAL UTERINE AND VAGINAL BLEEDING, UNSPECIFIED: ICD-10-CM

## 2025-02-26 LAB — HCG UR QL: NEGATIVE — SIGNIFICANT CHANGE UP

## 2025-02-26 PROCEDURE — 88305 TISSUE EXAM BY PATHOLOGIST: CPT | Mod: 26

## 2025-02-26 PROCEDURE — 58558 HYSTEROSCOPY BIOPSY: CPT

## 2025-02-26 PROCEDURE — 88342 IMHCHEM/IMCYTCHM 1ST ANTB: CPT | Mod: 26

## 2025-02-26 RX ORDER — AMLODIPINE BESYLATE 10 MG/1
1 TABLET ORAL
Refills: 0 | DISCHARGE

## 2025-02-26 RX ORDER — SODIUM CHLORIDE 9 G/1000ML
1000 INJECTION, SOLUTION INTRAVENOUS
Refills: 0 | Status: DISCONTINUED | OUTPATIENT
Start: 2025-02-26 | End: 2025-03-12

## 2025-02-26 RX ORDER — IBUPROFEN 200 MG
1 TABLET ORAL
Qty: 0 | Refills: 0 | DISCHARGE

## 2025-02-26 RX ORDER — FENTANYL CITRATE-0.9 % NACL/PF 100MCG/2ML
25 SYRINGE (ML) INTRAVENOUS
Refills: 0 | Status: DISCONTINUED | OUTPATIENT
Start: 2025-02-26 | End: 2025-02-26

## 2025-02-26 RX ORDER — LABETALOL HYDROCHLORIDE 200 MG/1
1 TABLET, FILM COATED ORAL
Refills: 0 | DISCHARGE

## 2025-02-26 RX ORDER — ONDANSETRON HCL/PF 4 MG/2 ML
4 VIAL (ML) INJECTION ONCE
Refills: 0 | Status: DISCONTINUED | OUTPATIENT
Start: 2025-02-26 | End: 2025-03-12

## 2025-02-26 RX ORDER — ACETAMINOPHEN 500 MG/5ML
3 LIQUID (ML) ORAL
Qty: 0 | Refills: 0 | DISCHARGE

## 2025-02-26 NOTE — ASU DISCHARGE PLAN (ADULT/PEDIATRIC) - FINANCIAL ASSISTANCE
[facility] provides services at a reduced cost to those who are determined to be eligible through [facility]’s financial assistance program. Information regarding [facility]’s financial assistance program can be found by going to [email or web address] or by calling [phone].

## 2025-02-26 NOTE — ASU DISCHARGE PLAN (ADULT/PEDIATRIC) - NURSING INSTRUCTIONS
Last dose of TYLENOL for pain management was at 12:45 PM. Next dose of TYLENOL may be taken at or after 6:45 PM if needed. DO NOT take any additional products containing TYLENOL or ACETAMINOPHEN, such as VICODIN, PERCOCET, NORCO, EXCEDRIN, and any over-the-counter cold medications. DO NOT CONSUME MORE THAN 6042-0840 MG OF TYLENOL (acetaminophen) in a 24-hour period.

## 2025-02-26 NOTE — BRIEF OPERATIVE NOTE - OPERATION/FINDINGS
EUA: grossly normal appearing external genitalia, cervix, vagina.  Hysteroscopy: bilateral ostia visualized, wnl. Polypoid tissue visualized on anterior wall of uterus. Otherwise grossly normal appearing uterine cavity and endometrium.   EUA: grossly normal appearing external genitalia, cervix, vagina.  Hysteroscopy: bilateral ostia visualized, wnl. Small amount of polypoid tissue visualized on anterior wall of uterus. Otherwise grossly normal appearing uterine cavity and endometrium.

## 2025-02-26 NOTE — ASU DISCHARGE PLAN (ADULT/PEDIATRIC) - CARE PROVIDER_API CALL
Milvia Gaxiola  Gynecologic Oncology  [address] [place] [address]  Phone: [phone]  Fax: [phone]  Follow Up Time: 2 weeks

## 2025-02-26 NOTE — ASU DISCHARGE PLAN (ADULT/PEDIATRIC) - ASU DC SPECIAL INSTRUCTIONSFT
Postoperative Instructions    For pain control, take the followin. Ibuprofen 600mg every 6 hours, take with food  2. Add Tylenol 975mg every 6 hours, alternated with ibuprofen  Tylenol and ibuprofen may be obtained over the counter.    Return to your regular way of eating.     Resume normal activity as tolerated, but no heavy lifting or strenuous activity for 2 weeks. Complete vaginal rest, no tampons, no douching, no tub bathing, no sexual activities for 2 weeks unless otherwise instructed by your doctor.      Call your doctor with any signs and symptoms of infection such as fever (>100.4 F), chills, nausea or vomiting.  Call your doctor if you're unable to tolerate food or have difficulty urinating.  Call your doctor if you have pain that is not relieved by your prescribed medications.     Notify your doctor with any other concerns. Follow up with your doctor on  for a post-operative appointment. Postoperative Instructions    For pain control, take the followin.  Tylenol 975mg every 6 hours  Tylenol may be obtained over the counter.    Return to your regular way of eating.     Resume normal activity as tolerated, but no heavy lifting or strenuous activity for 2 weeks. Complete vaginal rest, no tampons, no douching, no tub bathing, no sexual activities for 2 weeks unless otherwise instructed by your doctor.      Call your doctor with any signs and symptoms of infection such as fever (>100.4 F), chills, nausea or vomiting.  Call your doctor if you're unable to tolerate food or have difficulty urinating.  Call your doctor if you have pain that is not relieved by your prescribed medications.     Notify your doctor with any other concerns. Follow up with your doctor on  for a post-operative appointment.

## 2025-02-26 NOTE — BRIEF OPERATIVE NOTE - NSICDXBRIEFPROCEDURE_GEN_ALL_CORE_FT
PROCEDURES:  Diagnostic hysteroscopy 26-Feb-2025 13:21:13  Canals, Joellen  Dilation and curettage, uterus 26-Feb-2025 13:21:23  Canals, Joellen  Dilation and curettage, cervix 26-Feb-2025 13:21:34  Joellen Bello

## 2025-02-27 ENCOUNTER — NON-APPOINTMENT (OUTPATIENT)
Age: 55
End: 2025-02-27

## 2025-03-14 DIAGNOSIS — D21.9 BENIGN NEOPLASM OF CONNECTIVE AND OTHER SOFT TISSUE, UNSPECIFIED: ICD-10-CM

## 2025-03-14 LAB — SURGICAL PATHOLOGY STUDY: SIGNIFICANT CHANGE UP

## 2025-03-27 ENCOUNTER — APPOINTMENT (OUTPATIENT)
Dept: GYNECOLOGIC ONCOLOGY | Facility: CLINIC | Age: 55
End: 2025-03-27
Payer: MEDICAID

## 2025-03-27 VITALS
BODY MASS INDEX: 30.73 KG/M2 | DIASTOLIC BLOOD PRESSURE: 92 MMHG | OXYGEN SATURATION: 99 % | TEMPERATURE: 98.1 F | HEART RATE: 55 BPM | SYSTOLIC BLOOD PRESSURE: 151 MMHG | WEIGHT: 180 LBS | HEIGHT: 64 IN

## 2025-03-27 DIAGNOSIS — N93.9 ABNORMAL UTERINE AND VAGINAL BLEEDING, UNSPECIFIED: ICD-10-CM

## 2025-03-27 PROCEDURE — 99212 OFFICE O/P EST SF 10 MIN: CPT

## (undated) DEVICE — GOWN LG

## (undated) DEVICE — SOL IRR POUR NS 0.9% 500ML

## (undated) DEVICE — LAP PAD 18 X 18"

## (undated) DEVICE — DRAPE TOWEL BLUE 17" X 24"

## (undated) DEVICE — PACK D&C

## (undated) DEVICE — DRAPE 3/4 SHEET 52X76"

## (undated) DEVICE — FOLEY TRAY 16FR LF URINE METER SURESTEP

## (undated) DEVICE — TUBING IRR SET FOR CYSTOSCOPY 77"

## (undated) DEVICE — GLV 6.5 PROTEXIS (BLUE)

## (undated) DEVICE — SOL IRR POUR H2O 250ML

## (undated) DEVICE — DRAPE MAYO STAND 23"

## (undated) DEVICE — WARMING BLANKET UPPER ADULT

## (undated) DEVICE — ELCTR CUTTING LOOP 24FR RIGHT ANGLE

## (undated) DEVICE — SPECIMEN CONTAINER 100ML

## (undated) DEVICE — TUBING SUCTION 20FT

## (undated) DEVICE — CABLE DAC ACTIVE CORD

## (undated) DEVICE — GLV 6 PROTEXIS (WHITE)

## (undated) DEVICE — DRSG TELFA 3 X 8

## (undated) DEVICE — DRAPE LAVH 124" X 30" X125"

## (undated) DEVICE — POSITIONER FOAM EGG CRATE ULNAR 2PCS (PINK)

## (undated) DEVICE — VENODYNE/SCD SLEEVE CALF MEDIUM

## (undated) DEVICE — SOL ANTI FOG (FRED)

## (undated) DEVICE — PACK MAJOR CHEST BREAST

## (undated) DEVICE — DRAPE INSTRUMENT POUCH 6.75" X 11"

## (undated) DEVICE — DRAPE 1/2 SHEET 40X57"

## (undated) DEVICE — CANISTER SUCTION 2000CC

## (undated) DEVICE — STAPLER SKIN PROXIMATE ROTATING WIDE

## (undated) DEVICE — ELCTR CUTTING LOOP 24FR 12 DEG 0.35 WIRE

## (undated) DEVICE — DRAPE UNDER BUTTOCKS W SCREEN

## (undated) DEVICE — PREP BETADINE KIT